# Patient Record
Sex: MALE | Race: WHITE | NOT HISPANIC OR LATINO | Employment: OTHER
[De-identification: names, ages, dates, MRNs, and addresses within clinical notes are randomized per-mention and may not be internally consistent; named-entity substitution may affect disease eponyms.]

---

## 2020-02-24 ENCOUNTER — HEALTH MAINTENANCE LETTER (OUTPATIENT)
Age: 80
End: 2020-02-24

## 2020-12-13 ENCOUNTER — HEALTH MAINTENANCE LETTER (OUTPATIENT)
Age: 80
End: 2020-12-13

## 2021-04-17 ENCOUNTER — HEALTH MAINTENANCE LETTER (OUTPATIENT)
Age: 81
End: 2021-04-17

## 2021-06-10 ENCOUNTER — RECORDS - HEALTHEAST (OUTPATIENT)
Dept: ADMINISTRATIVE | Facility: OTHER | Age: 81
End: 2021-06-10

## 2021-06-10 ENCOUNTER — OFFICE VISIT (OUTPATIENT)
Dept: NEUROLOGY | Facility: CLINIC | Age: 81
End: 2021-06-10
Payer: MEDICARE

## 2021-06-10 ENCOUNTER — AMBULATORY - HEALTHEAST (OUTPATIENT)
Dept: LAB | Facility: HOSPITAL | Age: 81
End: 2021-06-10

## 2021-06-10 VITALS
DIASTOLIC BLOOD PRESSURE: 70 MMHG | BODY MASS INDEX: 26.88 KG/M2 | SYSTOLIC BLOOD PRESSURE: 116 MMHG | HEART RATE: 125 BPM | WEIGHT: 186 LBS

## 2021-06-10 DIAGNOSIS — H53.2 DIPLOPIA: Primary | ICD-10-CM

## 2021-06-10 DIAGNOSIS — R29.2 HYPERREFLEXIA: ICD-10-CM

## 2021-06-10 DIAGNOSIS — H53.2 DIPLOPIA: ICD-10-CM

## 2021-06-10 DIAGNOSIS — G25.0 ESSENTIAL TREMOR: ICD-10-CM

## 2021-06-10 DIAGNOSIS — R20.8 DECREASED VIBRATORY SENSE: ICD-10-CM

## 2021-06-10 LAB
ALBUMIN SERPL-MCNC: 4.2 G/DL (ref 3.5–5)
ALP SERPL-CCNC: 75 U/L (ref 45–120)
ALT SERPL W P-5'-P-CCNC: 16 U/L (ref 0–45)
ANION GAP SERPL CALCULATED.3IONS-SCNC: 9 MMOL/L (ref 5–18)
AST SERPL W P-5'-P-CCNC: 24 U/L (ref 0–40)
BILIRUB SERPL-MCNC: 0.7 MG/DL (ref 0–1)
BUN SERPL-MCNC: 21 MG/DL (ref 8–28)
CALCIUM SERPL-MCNC: 9.5 MG/DL (ref 8.5–10.5)
CHLORIDE BLD-SCNC: 105 MMOL/L (ref 98–107)
CO2 SERPL-SCNC: 25 MMOL/L (ref 22–31)
CREAT SERPL-MCNC: 1.09 MG/DL (ref 0.7–1.3)
GFR SERPL CREATININE-BSD FRML MDRD: >60 ML/MIN/1.73M2
GLUCOSE BLD-MCNC: 89 MG/DL (ref 70–125)
POTASSIUM BLD-SCNC: 3.9 MMOL/L (ref 3.5–5)
PROT SERPL-MCNC: 7.2 G/DL (ref 6–8)
SODIUM SERPL-SCNC: 139 MMOL/L (ref 136–145)
TSH SERPL DL<=0.005 MIU/L-ACNC: 2.42 UIU/ML (ref 0.3–5)
VIT B12 SERPL-MCNC: 555 PG/ML (ref 213–816)

## 2021-06-10 PROCEDURE — 99215 OFFICE O/P EST HI 40 MIN: CPT | Performed by: PSYCHIATRY & NEUROLOGY

## 2021-06-10 RX ORDER — ASPIRIN 81 MG/1
81 TABLET, CHEWABLE ORAL
COMMUNITY

## 2021-06-10 RX ORDER — PRIMIDONE 50 MG/1
TABLET ORAL
Qty: 180 TABLET | Refills: 0 | Status: SHIPPED | OUTPATIENT
Start: 2021-06-10 | End: 2021-07-13

## 2021-06-10 RX ORDER — TAMSULOSIN HYDROCHLORIDE 0.4 MG/1
0.4 CAPSULE ORAL
COMMUNITY
Start: 2020-07-08

## 2021-06-10 RX ORDER — ALLOPURINOL 300 MG/1
150 TABLET ORAL
COMMUNITY
Start: 2020-10-08

## 2021-06-10 RX ORDER — METOPROLOL SUCCINATE 100 MG/1
100 TABLET, EXTENDED RELEASE ORAL
COMMUNITY
End: 2021-07-13

## 2021-06-10 NOTE — LETTER
6/10/2021         RE: Willis Soni  [address]  [facility] [address]        Dear Colleague,    Thank you for referring your patient, Willis Soni, to the [facility] [place]. Please see a copy of my visit note below.    NEUROLOGY OUTPATIENT CONSULT NOTE   Rojelio 10, 2021     CHIEF COMPLAINT/REASON FOR VISIT/REASON FOR CONSULT  Patient presents with:  Tremors: Patient is in today for tremors, bilateral upper extremities. Duration of longer than 20 years, has worsened in the last 5 years.     REASON FOR CONSULTATION- Tremors    HISTORY OF PRESENT ILLNESS  Willis Soni is a 81 year old male seen today for evaluation of tremors.  He reports that he has had tremors for the last 20 years.  They have progressively gotten worse.  He does have family history of tremors including his brother sister and mother.  Tremors are mainly prominent when he is trying to eat.  Tremors are not bothersome at rest.  Reports no significant parkinsonian symptoms.  No memory problems.  No difficulty keeping up with other people.  Denies any balance issues.  Denies any weakness in the arms.  Tremors are present in both upper extremities.  Has not tried any medications for this.    He does complain of episodes of double vision.  These are brought on by a staring on the television louder when he strongly uses hands and eyes to complete a jigsaw puzzle.  Has had cataract surgery in the past.  Closing one eye does improve the double vision.    Previous history is reviewed and this is unchanged.    PAST MEDICAL/SURGICAL HISTORY  Past Medical History:   Diagnosis Date     Adenomatous polyp 03/1995     Anxiety      ED (erectile dysfunction)      Herpes simplex      HTN (hypertension)      Hyperlipidemia LDL goal < 130 05/2004     Renal stones 08/1994     Tremors 1980     Patient Active Problem List   Diagnosis     Hyperlipidemia with target LDL less than 130     HTN (hypertension)     Renal stone     Cataract    Significant tremors    FAMILY HISTORY  Family History   Problem Relation Age of Onset     Hypertension Mother         d. 80     Asthma Son      C.A.D. Father         MI d. 68     Obesity Daughter    Family history suggestive of heart attack, tremors    SOCIAL HISTORY  Social History     Tobacco Use     Smoking status: Never Smoker     Smokeless tobacco: Never Used   Substance Use Topics     Alcohol use: Yes     Comment: on occasion     Drug use: No       SYSTEMS REVIEW  Twelve-system ROS was done and other than the HPI this was negative except for tremors    MEDICATIONS  allopurinol (ZYLOPRIM) 300 MG tablet, Take 150 mg by mouth  aspirin (ASA) 81 MG chewable tablet, Take 81 mg by mouth  metoprolol (LOPRESSOR) 25 MG tablet, Take 1 tablet by mouth 2 times daily.  metoprolol succinate ER (TOPROL-XL) 100 MG 24 hr tablet, Take 100 mg by mouth  Multiple Vitamin (MULTIVITAMIN PO), Take  by mouth.  potassium citrate (UROCIT-K) 10 MEQ (1080 MG) SR tablet, Take 1 tablet by mouth daily. Please schedule an appointment for future refills  pravastatin (PRAVACHOL) 40 MG tablet, Take 1 tablet by mouth At Bedtime.  tamsulosin (FLOMAX) 0.4 MG capsule, Take 0.4 mg by mouth  chlorthalidone (HYGROTON) 25 MG tablet, Take 1 tablet (25 mg) by mouth every morning (Patient not taking: Reported on 6/10/2021)    No current facility-administered medications on file prior to visit.        PHYSICAL EXAMINATION  VITALS: /70 (BP Location: Right arm, Patient Position: Sitting)   Pulse 125   Wt 84.4 kg (186 lb)   BMI 26.88 kg/m    GENERAL: Healthy appearing, alert, no acute distress, normal habitus.  CARDIOVASCULAR: Extremities warm and well perfused. Pulses present.   EYES: Funduscopic exam limited.  NEUROLOGICAL:  Patient is awake and oriented to self, place and time.  Attention span is normal.  Memory is grossly intact.  Language is fluent and follows commands appropriately.  Appropriate fund of knowledge. Cranial nerves 2-12 are  intact. There is no pronator drift.  Motor exam shows 5/5 strength in all extremities.  Tone is symmetric bilaterally in upper and lower extremities.  Reflexes are symmetric and 2+ brisk in upper extremities and lower extremities. Sensory exam is grossly intact to light touch, pin prick and vibration with decreased vibration in the right leg at the ankle.  Finger to nose and heel to shin is without dysmetria.  Romberg is negative.  Gait is normal and the patient is able to do tandem walk and walk on toes and heels.  On drawing concentric circles he does have significant tremor in both upper extremities.  Postural tremor is seen on exam as well.    DIAGNOSTICS  RELEVANT LABS  CMP and CBC non-contributory    OUTSIDE RECORDS  Outside referral notes and chart notes were reviewed and pertinent information has been summarized (in addition to the HPI):-Seen in primary care.  Goes to to [place]  every year.  He has had the Covid shot.  Had recently had some blood testing done.    IMPRESSION/REPORT/PLAN  Diplopia  Essential tremor  Hyperreflexia  Decreased vibratory sense    This is a 81 year old male tremor suggestive of essential tremor.  I will check blood work to look for causes of essential tremor.  We will put him on primidone and see how he does.    Patient does have some intermittent episodes of diplopia.  This could be related to myasthenia gravis since they are more prominent when he is more tired.  We will check a myasthenia gravis panel.  Could try medication down the road if need be.  Could consider an MRI of the brain.    On examination patient has decreased vibratory sense in the right foot along with hyperreflexia.  Is possible he has cervical spinal stenosis versus other lesions.  It is unclear if this is related to the tremor or not.  We will see how he does with the primidone and could further check an MRI of the cervical spine to see if a cause for these exam findings can be found.    -     primidone  (MYSOLINE) 50 MG tablet; Take 0.5 tabs BID for 1 week and then increase to 1 tabs BID as tolerated and needed.  -     Vitamin B12; Future  -     TSH with free T4 reflex; Future  -     ACETYLCHOLINE RECEPTOR BINDING; Future  -     STRIATED MUSCLE ANTIBODY IGG; Future  -     ACETYLCHOLINE MODULATING ANTIBODY; Future  -     ACETYLCHOLINE RECEPTOR BLOCKING PAULINA; Future  -     Ceruloplasmin; Future  -     Copper level; Future  -     Comprehensive metabolic panel; Future  -     Vitamin B1 whole blood; Future  -     Protein Immunofixation Serum; Future  -     Protein electrophoresis; Future    Return in about 1 month (around 7/10/2021) for In-Clinic Visit, After testing.    Over 60 minutes were spent coordinating the care for the patient on the day of the encounter.  This includes previsit, during visit and post visit activities as documented above.  (Activities include but not inclusive of reviewing chart, reviewing outside records, reviewing labs and imaging study results as well as the images, patient visit time including getting history and exam,  use if applicable, review of test results with the patient and coming up with a plan in a shared model, counseling patient and family, education and answering patient questions, EMR , EMR diagnosis entry and problem list management, medication reconciliation and prescription management if applicable, paperwork if applicable, printing documents and documentation of the visit activities.)  MDM:-High risk with extensive testing    Gabe Rosa MD  Neurologist  [facility]  Tel:- [phone]    This note was dictated using voice recognition software.  Any grammatical or context distortions are unintentional and inherent to the software.        Again, thank you for allowing me to participate in the care of your patient.        Sincerely,        Gabe Rosa MD

## 2021-06-10 NOTE — PROGRESS NOTES
NEUROLOGY OUTPATIENT CONSULT NOTE   Rojelio 10, 2021     CHIEF COMPLAINT/REASON FOR VISIT/REASON FOR CONSULT  Patient presents with:  Tremors: Patient is in today for tremors, bilateral upper extremities. Duration of longer than 20 years, has worsened in the last 5 years.     REASON FOR CONSULTATION- Tremors    HISTORY OF PRESENT ILLNESS  Willis Soin is a 81 year old male seen today for evaluation of tremors.  He reports that he has had tremors for the last 20 years.  They have progressively gotten worse.  He does have family history of tremors including his brother sister and mother.  Tremors are mainly prominent when he is trying to eat.  Tremors are not bothersome at rest.  Reports no significant parkinsonian symptoms.  No memory problems.  No difficulty keeping up with other people.  Denies any balance issues.  Denies any weakness in the arms.  Tremors are present in both upper extremities.  Has not tried any medications for this.    He does complain of episodes of double vision.  These are brought on by a staring on the television louder when he strongly uses hands and eyes to complete a jigsaw puzzle.  Has had cataract surgery in the past.  Closing one eye does improve the double vision.    Previous history is reviewed and this is unchanged.    PAST MEDICAL/SURGICAL HISTORY  Past Medical History:   Diagnosis Date     Adenomatous polyp 03/1995     Anxiety      ED (erectile dysfunction)      Herpes simplex      HTN (hypertension)      Hyperlipidemia LDL goal < 130 05/2004     Renal stones 08/1994     Tremors 1980     Patient Active Problem List   Diagnosis     Hyperlipidemia with target LDL less than 130     HTN (hypertension)     Renal stone     Cataract   Significant tremors    FAMILY HISTORY  Family History   Problem Relation Age of Onset     Hypertension Mother         d. 80     Asthma Son      C.A.D. Father         MI d. 68     Obesity Daughter    Family history suggestive of heart attack,  tremors    SOCIAL HISTORY  Social History     Tobacco Use     Smoking status: Never Smoker     Smokeless tobacco: Never Used   Substance Use Topics     Alcohol use: Yes     Comment: on occasion     Drug use: No       SYSTEMS REVIEW  Twelve-system ROS was done and other than the HPI this was negative except for tremors    MEDICATIONS  allopurinol (ZYLOPRIM) 300 MG tablet, Take 150 mg by mouth  aspirin (ASA) 81 MG chewable tablet, Take 81 mg by mouth  metoprolol (LOPRESSOR) 25 MG tablet, Take 1 tablet by mouth 2 times daily.  metoprolol succinate ER (TOPROL-XL) 100 MG 24 hr tablet, Take 100 mg by mouth  Multiple Vitamin (MULTIVITAMIN PO), Take  by mouth.  potassium citrate (UROCIT-K) 10 MEQ (1080 MG) SR tablet, Take 1 tablet by mouth daily. Please schedule an appointment for future refills  pravastatin (PRAVACHOL) 40 MG tablet, Take 1 tablet by mouth At Bedtime.  tamsulosin (FLOMAX) 0.4 MG capsule, Take 0.4 mg by mouth  chlorthalidone (HYGROTON) 25 MG tablet, Take 1 tablet (25 mg) by mouth every morning (Patient not taking: Reported on 6/10/2021)    No current facility-administered medications on file prior to visit.        PHYSICAL EXAMINATION  VITALS: /70 (BP Location: Right arm, Patient Position: Sitting)   Pulse 125   Wt 84.4 kg (186 lb)   BMI 26.88 kg/m    GENERAL: Healthy appearing, alert, no acute distress, normal habitus.  CARDIOVASCULAR: Extremities warm and well perfused. Pulses present.   EYES: Funduscopic exam limited.  NEUROLOGICAL:  Patient is awake and oriented to self, place and time.  Attention span is normal.  Memory is grossly intact.  Language is fluent and follows commands appropriately.  Appropriate fund of knowledge. Cranial nerves 2-12 are intact. There is no pronator drift.  Motor exam shows 5/5 strength in all extremities.  Tone is symmetric bilaterally in upper and lower extremities.  Reflexes are symmetric and 2+ brisk in upper extremities and lower extremities. Sensory exam is  grossly intact to light touch, pin prick and vibration with decreased vibration in the right leg at the ankle.  Finger to nose and heel to shin is without dysmetria.  Romberg is negative.  Gait is normal and the patient is able to do tandem walk and walk on toes and heels.  On drawing concentric circles he does have significant tremor in both upper extremities.  Postural tremor is seen on exam as well.    DIAGNOSTICS  RELEVANT LABS  CMP and CBC non-contributory    OUTSIDE RECORDS  Outside referral notes and chart notes were reviewed and pertinent information has been summarized (in addition to the HPI):-Seen in primary care.  Goes to to [place]  every year.  He has had the Covid shot.  Had recently had some blood testing done.    IMPRESSION/REPORT/PLAN  Diplopia  Essential tremor  Hyperreflexia  Decreased vibratory sense    This is a 81 year old male tremor suggestive of essential tremor.  I will check blood work to look for causes of essential tremor.  We will put him on primidone and see how he does.    Patient does have some intermittent episodes of diplopia.  This could be related to myasthenia gravis since they are more prominent when he is more tired.  We will check a myasthenia gravis panel.  Could try medication down the road if need be.  Could consider an MRI of the brain.    On examination patient has decreased vibratory sense in the right foot along with hyperreflexia.  Is possible he has cervical spinal stenosis versus other lesions.  It is unclear if this is related to the tremor or not.  We will see how he does with the primidone and could further check an MRI of the cervical spine to see if a cause for these exam findings can be found.    -     primidone (MYSOLINE) 50 MG tablet; Take 0.5 tabs BID for 1 week and then increase to 1 tabs BID as tolerated and needed.  -     Vitamin B12; Future  -     TSH with free T4 reflex; Future  -     ACETYLCHOLINE RECEPTOR BINDING; Future  -     STRIATED MUSCLE  ANTIBODY IGG; Future  -     ACETYLCHOLINE MODULATING ANTIBODY; Future  -     ACETYLCHOLINE RECEPTOR BLOCKING PAULINA; Future  -     Ceruloplasmin; Future  -     Copper level; Future  -     Comprehensive metabolic panel; Future  -     Vitamin B1 whole blood; Future  -     Protein Immunofixation Serum; Future  -     Protein electrophoresis; Future    Return in about 1 month (around 7/10/2021) for In-Clinic Visit, After testing.    Over 60 minutes were spent coordinating the care for the patient on the day of the encounter.  This includes previsit, during visit and post visit activities as documented above.  (Activities include but not inclusive of reviewing chart, reviewing outside records, reviewing labs and imaging study results as well as the images, patient visit time including getting history and exam,  use if applicable, review of test results with the patient and coming up with a plan in a shared model, counseling patient and family, education and answering patient questions, EMR , EMR diagnosis entry and problem list management, medication reconciliation and prescription management if applicable, paperwork if applicable, printing documents and documentation of the visit activities.)  MDM:-High risk with extensive testing    Gabe Rosa MD  Neurologist  [facility]  Tel:- [phone]    This note was dictated using voice recognition software.  Any grammatical or context distortions are unintentional and inherent to the software.

## 2021-06-10 NOTE — NURSING NOTE
Chief Complaint   Patient presents with     Tremors     Patient is in today for tremors, bilateral upper extremities. Duration of longer than 20 years, has worsened in the last 5 years.      Mignon Guzman MA on 6/10/2021 at 1:34 PM

## 2021-06-13 LAB — ARUP MISCELLANEOUS TEST: NORMAL

## 2021-06-14 LAB
ALBUMIN PERCENT: 65.8 % (ref 51–67)
ALBUMIN SERPL ELPH-MCNC: 4.5 G/DL (ref 3.2–4.7)
ALPHA 1 PERCENT: 2.2 % (ref 2–4)
ALPHA 2 PERCENT: 9.1 % (ref 5–13)
ALPHA1 GLOB SERPL ELPH-MCNC: 0.2 G/DL (ref 0.1–0.3)
ALPHA2 GLOB SERPL ELPH-MCNC: 0.6 G/DL (ref 0.4–0.9)
B-GLOBULIN SERPL ELPH-MCNC: 0.7 G/DL (ref 0.7–1.2)
BETA PERCENT: 9.5 % (ref 10–17)
CERULOPLASMIN SERPL-MCNC: 23 MG/DL (ref 20–60)
COPPER SERPL-MCNC: 107.8 UG/DL (ref 70–140)
GAMMA GLOB SERPL ELPH-MCNC: 0.9 G/DL (ref 0.6–1.4)
GAMMA GLOBULIN PERCENT: 13.4 % (ref 9–20)
MISCELLANEOUS TEST DEPT. - HE HISTORICAL: NORMAL
PATH ICD:: ABNORMAL
PATH ICD:: NORMAL
PERFORMING LAB: NORMAL
PROT PATTERN SERPL ELPH-IMP: ABNORMAL
PROT PATTERN SERPL IFE-IMP: NORMAL
PROT SERPL-MCNC: 6.9 G/DL (ref 6–8)
REVIEWING PATHOLOGIST: ABNORMAL
REVIEWING PATHOLOGIST: NORMAL
SPECIMEN STATUS: NORMAL
TEST NAME: NORMAL

## 2021-06-16 LAB — ARUP MISCELLANEOUS TEST: NORMAL

## 2021-06-17 ENCOUNTER — TELEPHONE (OUTPATIENT)
Dept: NEUROLOGY | Facility: CLINIC | Age: 81
End: 2021-06-17

## 2021-06-17 LAB — VIT B1 PYROPHOSHATE BLD-SCNC: 186 NMOL/L (ref 70–180)

## 2021-06-17 NOTE — TELEPHONE ENCOUNTER
Pt lm that he is having dizziness, and feels light hardheaded form this new med. He is afraid to take it.  [phone]

## 2021-06-17 NOTE — TELEPHONE ENCOUNTER
Spoke with pt. Encouraged pt to try .25 pill BID and 0.5 pill BID as tolerated. Pt mentioned if it continues to give him symptoms of blurry eyes, lightheadedness, dizziness, feeling tired and fatigued, he may stop the medication. Informed pt to try the 0.25 first and if it does not work, other therapies can be looked into at the next appointment.     Pt is requesting lab results .    Component      Latest Ref Rng & Units 6/10/2021           2:58 PM   Sodium      136 - 145 mmol/L 139   Potassium      3.5 - 5.0 mmol/L 3.9   Chloride      98 - 107 mmol/L 105   CO2      22 - 31 mmol/L 25   Anion Gap, Calculation      5 - 18 mmol/L 9   Glucose      70 - 125 mg/dL 89   BUN      8 - 28 mg/dL 21   Creatinine      0.70 - 1.30 mg/dL 1.09   GFR MDRD Af Amer      >60 mL/min/1.73m2 >60   GFR MDRD Non Af Amer      >60 mL/min/1.73m2 >60   Bilirubin, Total      0.0 - 1.0 mg/dL 0.7   Calcium      8.5 - 10.5 mg/dL 9.5   Protein, Total      6.0 - 8.0 g/dL 7.2   ALBUMIN      3.5 - 5.0 g/dL 4.2   Alkaline Phosphatase      45 - 120 U/L 75   AST      0 - 40 U/L 24   ALT      0 - 45 U/L 16   Albumin %      51.0 - 67.0 %    Albumin       3.2 - 4.7 g/dL    Alpha 1 %      2.0 - 4.0 %    Alpha 1      0.1 - 0.3 g/dL    Alpha 2 %      5.0 - 13.0 %    Alpha 2      0.4 - 0.9 g/dL    % Beta      10.0 - 17.0 %    Beta      0.7 - 1.2 g/dL    Gamma Globulin %      9.0 - 20.0 %    Gamma Globulin      0.6 - 1.4 g/dL    ELP Comment          Path ICD:          Interpreted By:          Miscellanous Test Dept.       Chemistry Reference Test   Test Name       Striated Muscle Antibodies, IgG with Reflex to Titer   Performing Lab       [facility] . . .   Scan Result       See [facility] Miscellaneous Test for results   Immunofixation Electrophoresis, Serum          TSH      0.30 - 5.00 uIU/mL 2.42   Vitamin B-12      213 - 816 pg/mL 555   Vitamin B1, Whole Blood      70 - 180 nmol/L 186 (H)   Copper, Serum/Plasma      70.0 - 140.0 ug/dL 107.8    Ceruloplasmin      20 - 60 mg/dL 23   [facility] Miscellaneous Test       SEE NOTE     Component      Latest Ref Rng & Units 6/10/2021           2:58 PM   Sodium      136 - 145 mmol/L    Potassium      3.5 - 5.0 mmol/L    Chloride      98 - 107 mmol/L    CO2      22 - 31 mmol/L    Anion Gap, Calculation      5 - 18 mmol/L    Glucose      70 - 125 mg/dL    BUN      8 - 28 mg/dL    Creatinine      0.70 - 1.30 mg/dL    GFR MDRD Af Amer      >60 mL/min/1.73m2    GFR MDRD Non Af Amer      >60 mL/min/1.73m2    Bilirubin, Total      0.0 - 1.0 mg/dL    Calcium      8.5 - 10.5 mg/dL    Protein, Total      6.0 - 8.0 g/dL 6.9   ALBUMIN      3.5 - 5.0 g/dL    Alkaline Phosphatase      45 - 120 U/L    AST      0 - 40 U/L    ALT      0 - 45 U/L    Albumin %      51.0 - 67.0 % 65.8   Albumin       3.2 - 4.7 g/dL 4.5   Alpha 1 %      2.0 - 4.0 % 2.2   Alpha 1      0.1 - 0.3 g/dL 0.2   Alpha 2 %      5.0 - 13.0 % 9.1   Alpha 2      0.4 - 0.9 g/dL 0.6   % Beta      10.0 - 17.0 % 9.5 (L)   Beta      0.7 - 1.2 g/dL 0.7   Gamma Globulin %      9.0 - 20.0 % 13.4   Gamma Globulin      0.6 - 1.4 g/dL 0.9   ELP Comment       Unremarkable protein electrophoresis.   Path ICD:       G25.0   Interpreted By:       Zana Maddox MD   Miscellanous Test Dept.          Test Name          Performing Lab          Scan Result          Immunofixation Electrophoresis, Serum          TSH      0.30 - 5.00 uIU/mL    Vitamin B-12      213 - 816 pg/mL    Vitamin B1, Whole Blood      70 - 180 nmol/L    Copper, Serum/Plasma      70.0 - 140.0 ug/dL    Ceruloplasmin      20 - 60 mg/dL    [facility] Miscellaneous Test       SEE NOTE     Component      Latest Ref Rng & Units 6/10/2021           2:58 PM   Sodium      136 - 145 mmol/L    Potassium      3.5 - 5.0 mmol/L    Chloride      98 - 107 mmol/L    CO2      22 - 31 mmol/L    Anion Gap, Calculation      5 - 18 mmol/L    Glucose      70 - 125 mg/dL    BUN      8 - 28 mg/dL    Creatinine      0.70 - 1.30 mg/dL    GFR  MDRD Af Amer      >60 mL/min/1.73m2    GFR MDRD Non Af Amer      >60 mL/min/1.73m2    Bilirubin, Total      0.0 - 1.0 mg/dL    Calcium      8.5 - 10.5 mg/dL    Protein, Total      6.0 - 8.0 g/dL    ALBUMIN      3.5 - 5.0 g/dL    Alkaline Phosphatase      45 - 120 U/L    AST      0 - 40 U/L    ALT      0 - 45 U/L    Albumin %      51.0 - 67.0 %    Albumin       3.2 - 4.7 g/dL    Alpha 1 %      2.0 - 4.0 %    Alpha 1      0.1 - 0.3 g/dL    Alpha 2 %      5.0 - 13.0 %    Alpha 2      0.4 - 0.9 g/dL    % Beta      10.0 - 17.0 %    Beta      0.7 - 1.2 g/dL    Gamma Globulin %      9.0 - 20.0 %    Gamma Globulin      0.6 - 1.4 g/dL    ELP Comment          Path ICD:       G25.0   Interpreted By:       Zana Maddox MD   Miscellanous Test Dept.          Test Name          Performing Lab          Scan Result          Immunofixation Electrophoresis, Serum       Unremarkable immunofixation electrophoresis.   TSH      0.30 - 5.00 uIU/mL    Vitamin B-12      213 - 816 pg/mL    Vitamin B1, Whole Blood      70 - 180 nmol/L    Copper, Serum/Plasma      70.0 - 140.0 ug/dL    Ceruloplasmin      20 - 60 mg/dL    [facility] Miscellaneous Test

## 2021-06-17 NOTE — TELEPHONE ENCOUNTER
Try taking 0.25 pill BID or 0.5 pill BID as tolerated. If does not work then will have to try other meds at next apt.

## 2021-06-19 LAB
MISCELLANEOUS TEST DEPT. - HE HISTORICAL: NORMAL
PERFORMING LAB: NORMAL
SPECIMEN STATUS: NORMAL
TEST NAME: NORMAL

## 2021-06-25 NOTE — TELEPHONE ENCOUNTER
Follow up call to patient in regards to Primidone. He is still having symptoms of lightheadedness. Informed pt it is ok to stop the medication since he is still having side effects. Informed him, labs look ok otherwise. No further questions and pt verbalized understanding. Reminded pt of follow up appointment scheduled on 7/14 at 7:50 AM.

## 2021-07-13 ENCOUNTER — OFFICE VISIT (OUTPATIENT)
Dept: NEUROLOGY | Facility: CLINIC | Age: 81
End: 2021-07-13
Payer: MEDICARE

## 2021-07-13 VITALS
HEIGHT: 70 IN | DIASTOLIC BLOOD PRESSURE: 78 MMHG | BODY MASS INDEX: 26.4 KG/M2 | HEART RATE: 65 BPM | WEIGHT: 184.4 LBS | SYSTOLIC BLOOD PRESSURE: 140 MMHG

## 2021-07-13 DIAGNOSIS — G25.0 ESSENTIAL TREMOR: ICD-10-CM

## 2021-07-13 DIAGNOSIS — R29.2 HYPERREFLEXIA: ICD-10-CM

## 2021-07-13 DIAGNOSIS — R20.8 DECREASED VIBRATORY SENSE: ICD-10-CM

## 2021-07-13 DIAGNOSIS — H53.2 DIPLOPIA: Primary | ICD-10-CM

## 2021-07-13 PROCEDURE — 99215 OFFICE O/P EST HI 40 MIN: CPT | Performed by: PSYCHIATRY & NEUROLOGY

## 2021-07-13 RX ORDER — TOPIRAMATE 25 MG/1
TABLET, FILM COATED ORAL
Qty: 360 TABLET | Refills: 0 | Status: SHIPPED | OUTPATIENT
Start: 2021-07-13 | End: 2021-07-13

## 2021-07-13 RX ORDER — PROPRANOLOL HYDROCHLORIDE 20 MG/1
TABLET ORAL
Qty: 180 TABLET | Refills: 1 | Status: SHIPPED | OUTPATIENT
Start: 2021-07-13 | End: 2021-08-06

## 2021-07-13 ASSESSMENT — MIFFLIN-ST. JEOR: SCORE: 1537.68

## 2021-07-13 NOTE — NURSING NOTE
Chief Complaint   Patient presents with     Tremors     Stopped Primidone due to dizziness and feeling tired.     Sondra Queen RN on 7/13/2021 at 7:45 AM

## 2021-07-13 NOTE — PROGRESS NOTES
NEUROLOGY OUTPATIENT PROGRESS NOTE   Jul 13, 2021     CHIEF COMPLAINT/REASON FOR VISIT/REASON FOR CONSULT  Patient presents with:  Tremors: Stopped Primidone due to dizziness and feeling tired.    REASON FOR CONSULTATION- Tremors    HISTORY OF PRESENT ILLNESS  Willis Soni is a 81 year old male seen today for evaluation of tremors.  He reports that he has had tremors for the last 20 years.  They have progressively gotten worse.  He does have family history of tremors including his brother sister and mother.  Tremors are mainly prominent when he is trying to eat.  Tremors are not bothersome at rest.  Reports no significant parkinsonian symptoms.  No memory problems.  No difficulty keeping up with other people.  Denies any balance issues.  Denies any weakness in the arms.  Tremors are present in both upper extremities.  Has not tried any medications for this.    He does complain of episodes of double vision.  These are brought on by a staring on the television louder when he strongly uses hands and eyes to complete a jigsaw puzzle.  Has had cataract surgery in the past.  Closing one eye does improve the double vision.    7/14/21  Patient returns today.  He did try the primidone and try to cut down on the dose but continued to have lightheadedness.  He is currently off the medication.  Tremors are about the same.  Reports that the tremors only the revised to something.  Tremors have been there for several years.  He is on metoprolol for his heart.  The dosage recorded in the computer was incorrect and is taking 25 mg twice a day.  Took some time to clarify what dose he was taking.  Topamax was also discussed with him though he does have a history of kidney stones and initially prescribed though later was discontinued.    Patient does have some hyperreflexia on examination with some spells of double vision.  We talked about looking into this further and he wants to hold off on that for right now since this is  unrelated to his tremors.  He will be traveling to [place] in September.  Will be back in April.    Previous history is reviewed and this is unchanged.    PAST MEDICAL/SURGICAL HISTORY  Past Medical History:   Diagnosis Date     Adenomatous polyp 03/1995     Anxiety      ED (erectile dysfunction)      Herpes simplex      HTN (hypertension)      Hyperlipidemia LDL goal < 130 05/2004     Renal stones 08/1994     Tremors 1980     Patient Active Problem List   Diagnosis     Hyperlipidemia with target LDL less than 130     HTN (hypertension)     Renal stone     Cataract   Significant tremors    FAMILY HISTORY  Family History   Problem Relation Age of Onset     Hypertension Mother         d. 80     Parkinsonism Mother      Asthma Son      C.A.D. Father         MI d. 68     Obesity Daughter    Family history suggestive of heart attack, tremors    SOCIAL HISTORY  Social History     Tobacco Use     Smoking status: Never Smoker     Smokeless tobacco: Never Used   Substance Use Topics     Alcohol use: Yes     Comment: on occasion     Drug use: No       SYSTEMS REVIEW  Twelve-system ROS was done and other than the HPI this was negative except for tremors  No new issues.    MEDICATIONS  allopurinol (ZYLOPRIM) 300 MG tablet, Take 150 mg by mouth  aspirin (ASA) 81 MG chewable tablet, Take 81 mg by mouth  metoprolol (LOPRESSOR) 25 MG tablet, Take 1 tablet by mouth 2 times daily.  Multiple Vitamin (MULTIVITAMIN PO), Take  by mouth.  potassium citrate (UROCIT-K) 10 MEQ (1080 MG) SR tablet, Take 1 tablet by mouth daily. Please schedule an appointment for future refills  pravastatin (PRAVACHOL) 40 MG tablet, Take 1 tablet by mouth At Bedtime.  tamsulosin (FLOMAX) 0.4 MG capsule, Take 0.4 mg by mouth  chlorthalidone (HYGROTON) 25 MG tablet, Take 1 tablet (25 mg) by mouth every morning (Patient not taking: Reported on 6/10/2021)    No current facility-administered medications on file prior to visit.       PHYSICAL EXAMINATION  VITALS: BP  "(!) 140/78   Pulse 65   Ht 1.778 m (5' 10\")   Wt 83.6 kg (184 lb 6.4 oz)   BMI 26.46 kg/m    GENERAL: Healthy appearing, alert, no acute distress, normal habitus.  CARDIOVASCULAR: Extremities warm and well perfused. Pulses present.   NEUROLOGICAL:  Patient is awake and oriented to self, place and time.  Attention span is normal.  Memory is grossly intact.  Language is fluent and follows commands appropriately.  Appropriate fund of knowledge. Cranial nerves 2-12 are intact. There is no pronator drift.  Motor exam shows 5/5 strength in all extremities.  Tone is symmetric bilaterally in upper and lower extremities.  Reflexes are symmetric and 2+ brisk in upper extremities and lower extremities. Sensory exam is grossly intact to light touch, pin prick and vibration with decreased vibration in the right leg at the ankle.  Finger to nose and heel to shin is without dysmetria.  Romberg is negative.  Gait is normal and the patient is able to do tandem walk and walk on toes and heels.  Previously - on drawing concentric circles he does have significant tremor in both upper extremities.  Postural tremor is seen on exam as well.    DIAGNOSTICS  RELEVANT LABS  CMP and CBC non-contributory    OUTSIDE RECORDS  Outside referral notes and chart notes were reviewed and pertinent information has been summarized (in addition to the HPI):-Seen in primary care.  Goes to to [place]  every year.  He has had the Covid shot.  Had recently had some blood testing done.    LABS  Component      Latest Ref Rng & Units 6/10/2021           2:58 PM   Sodium      136 - 145 mmol/L 139   Potassium      3.5 - 5.0 mmol/L 3.9   Chloride      98 - 107 mmol/L 105   CO2      22 - 31 mmol/L 25   Anion Gap, Calculation      5 - 18 mmol/L 9   Glucose      70 - 125 mg/dL 89   BUN      8 - 28 mg/dL 21   Creatinine      0.70 - 1.30 mg/dL 1.09   GFR MDRD Af Amer      >60 mL/min/1.73m2 >60   GFR MDRD Non Af Amer      >60 mL/min/1.73m2 >60   Bilirubin, Total     "  0.0 - 1.0 mg/dL 0.7   Calcium      8.5 - 10.5 mg/dL 9.5   Protein, Total      6.0 - 8.0 g/dL 7.2   ALBUMIN      3.5 - 5.0 g/dL 4.2   Alkaline Phosphatase      45 - 120 U/L 75   AST      0 - 40 U/L 24   ALT      0 - 45 U/L 16   Albumin %      51.0 - 67.0 %    Albumin       3.2 - 4.7 g/dL    Alpha 1 %      2.0 - 4.0 %    Alpha 1      0.1 - 0.3 g/dL    Alpha 2 %      5.0 - 13.0 %    Alpha 2      0.4 - 0.9 g/dL    % Beta      10.0 - 17.0 %    Beta      0.7 - 1.2 g/dL    Gamma Globulin %      9.0 - 20.0 %    Gamma Globulin      0.6 - 1.4 g/dL    ELP Comment          Path ICD:          Interpreted By:          Immunofixation Electrophoresis, Serum          TSH      0.30 - 5.00 uIU/mL 2.42   Vitamin B-12      213 - 816 pg/mL 555   Vitamin B1, Whole Blood      70 - 180 nmol/L 186 (H)   Copper, Serum/Plasma      70.0 - 140.0 ug/dL 107.8   Ceruloplasmin      20 - 60 mg/dL 23     Component      Latest Ref Rng & Units 6/10/2021           2:58 PM   Sodium      136 - 145 mmol/L    Potassium      3.5 - 5.0 mmol/L    Chloride      98 - 107 mmol/L    CO2      22 - 31 mmol/L    Anion Gap, Calculation      5 - 18 mmol/L    Glucose      70 - 125 mg/dL    BUN      8 - 28 mg/dL    Creatinine      0.70 - 1.30 mg/dL    GFR MDRD Af Amer      >60 mL/min/1.73m2    GFR MDRD Non Af Amer      >60 mL/min/1.73m2    Bilirubin, Total      0.0 - 1.0 mg/dL    Calcium      8.5 - 10.5 mg/dL    Protein, Total      6.0 - 8.0 g/dL 6.9   ALBUMIN      3.5 - 5.0 g/dL    Alkaline Phosphatase      45 - 120 U/L    AST      0 - 40 U/L    ALT      0 - 45 U/L    Albumin %      51.0 - 67.0 % 65.8   Albumin       3.2 - 4.7 g/dL 4.5   Alpha 1 %      2.0 - 4.0 % 2.2   Alpha 1      0.1 - 0.3 g/dL 0.2   Alpha 2 %      5.0 - 13.0 % 9.1   Alpha 2      0.4 - 0.9 g/dL 0.6   % Beta      10.0 - 17.0 % 9.5 (L)   Beta      0.7 - 1.2 g/dL 0.7   Gamma Globulin %      9.0 - 20.0 % 13.4   Gamma Globulin      0.6 - 1.4 g/dL 0.9   ELP Comment       Unremarkable protein  electrophoresis.   Path ICD:       G25.0   Interpreted By:       Zana Maddox MD   Immunofixation Electrophoresis, Serum          TSH      0.30 - 5.00 uIU/mL    Vitamin B-12      213 - 816 pg/mL    Vitamin B1, Whole Blood      70 - 180 nmol/L    Copper, Serum/Plasma      70.0 - 140.0 ug/dL    Ceruloplasmin      20 - 60 mg/dL      Component      Latest Ref Rng & Units 6/10/2021           2:58 PM   Sodium      136 - 145 mmol/L    Potassium      3.5 - 5.0 mmol/L    Chloride      98 - 107 mmol/L    CO2      22 - 31 mmol/L    Anion Gap, Calculation      5 - 18 mmol/L    Glucose      70 - 125 mg/dL    BUN      8 - 28 mg/dL    Creatinine      0.70 - 1.30 mg/dL    GFR MDRD Af Amer      >60 mL/min/1.73m2    GFR MDRD Non Af Amer      >60 mL/min/1.73m2    Bilirubin, Total      0.0 - 1.0 mg/dL    Calcium      8.5 - 10.5 mg/dL    Protein, Total      6.0 - 8.0 g/dL    ALBUMIN      3.5 - 5.0 g/dL    Alkaline Phosphatase      45 - 120 U/L    AST      0 - 40 U/L    ALT      0 - 45 U/L    Albumin %      51.0 - 67.0 %    Albumin       3.2 - 4.7 g/dL    Alpha 1 %      2.0 - 4.0 %    Alpha 1      0.1 - 0.3 g/dL    Alpha 2 %      5.0 - 13.0 %    Alpha 2      0.4 - 0.9 g/dL    % Beta      10.0 - 17.0 %    Beta      0.7 - 1.2 g/dL    Gamma Globulin %      9.0 - 20.0 %    Gamma Globulin      0.6 - 1.4 g/dL    ELP Comment          Path ICD:       G25.0   Interpreted By:       Zana Maddox MD   Immunofixation Electrophoresis, Serum       Unremarkable immunofixation electrophoresis.   TSH      0.30 - 5.00 uIU/mL    Vitamin B-12      213 - 816 pg/mL    Vitamin B1, Whole Blood      70 - 180 nmol/L    Copper, Serum/Plasma      70.0 - 140.0 ug/dL    Ceruloplasmin      20 - 60 mg/dL    MG panel negative    IMPRESSION/REPORT/PLAN  Diplopia  Essential tremor  Hyperreflexia stools  Decreased vibratory sense  History of kidney stones  Coronary artery disease    This is a 81 year old male tremor suggestive of essential tremor.  Blood work  is negative for cause of tremors.  Primidone caused side effects of lightheadedness.  Topamax cannot be used because of history of kidney stones.  He is currently on metoprolol which is stopped.  This is used for coronary artery disease.  He was on 25 mg twice a day of regular metoprolol.  I will start him on propanolol to see how he does.    Patient does have some intermittent episodes of diplopia.  This could be related to myasthenia gravis since they are more prominent when he is more tired.  Myasthenia gravis panel was negative.  We will continue to monitor.  Could consider an MRI of the brain.    On examination patient has decreased vibratory sense in the right foot along with hyperreflexia.  Is possible he has cervical spinal stenosis versus other lesions.  It is unclear if this is related to the tremor or not.  Most likely this is not related to the tremors.  Could check a MRI of the cervical spine down the road we will hold off for right now per patient request.    -     propranolol (INDERAL) 20 MG tablet; Take 1 tab BID and increase by 1 tab BID every week to a max of 3 tabs BID as needed and tolerated.    Return in about 1 month (around 8/13/2021) for In-Clinic Visit.    Over 40 minutes were spent coordinating the care for the patient on the day of the encounter.  This includes previsit, during visit and post visit activities as documented above.  Getting history regarding kidney stones.  Topamax was initially prescribed and stopped.  Getting information about the exact doses of metoprolol that he is taking.  Stop metoprolol and then restarting propanolol  (Activities include but not inclusive of reviewing chart, reviewing outside records, reviewing labs and imaging study results as well as the images, patient visit time including getting history and exam,  use if applicable, review of test results with the patient and coming up with a plan in a shared model, counseling patient and family,  education and answering patient questions, EMR , EMR diagnosis entry and problem list management, medication reconciliation and prescription management if applicable, paperwork if applicable, printing documents and documentation of the visit activities.)    Gabe Rosa MD  Neurologist  [facility]  Tel:- [phone]    This note was dictated using voice recognition software.  Any grammatical or context distortions are unintentional and inherent to the software.

## 2021-07-13 NOTE — LETTER
7/13/2021         RE: Willis Soni  [address]  [facility] [address]        Dear Colleague,    Thank you for referring your patient, Willis Soni, to the [facility] [place]. Please see a copy of my visit note below.    NEUROLOGY OUTPATIENT PROGRESS NOTE   Jul 13, 2021     CHIEF COMPLAINT/REASON FOR VISIT/REASON FOR CONSULT  Patient presents with:  Tremors: Stopped Primidone due to dizziness and feeling tired.    REASON FOR CONSULTATION- Tremors    HISTORY OF PRESENT ILLNESS  Willis Soni is a 81 year old male seen today for evaluation of tremors.  He reports that he has had tremors for the last 20 years.  They have progressively gotten worse.  He does have family history of tremors including his brother sister and mother.  Tremors are mainly prominent when he is trying to eat.  Tremors are not bothersome at rest.  Reports no significant parkinsonian symptoms.  No memory problems.  No difficulty keeping up with other people.  Denies any balance issues.  Denies any weakness in the arms.  Tremors are present in both upper extremities.  Has not tried any medications for this.    He does complain of episodes of double vision.  These are brought on by a staring on the television louder when he strongly uses hands and eyes to complete a jigsaw puzzle.  Has had cataract surgery in the past.  Closing one eye does improve the double vision.    7/14/21  Patient returns today.  He did try the primidone and try to cut down on the dose but continued to have lightheadedness.  He is currently off the medication.  Tremors are about the same.  Reports that the tremors only the revised to something.  Tremors have been there for several years.  He is on metoprolol for his heart.  The dosage recorded in the computer was incorrect and is taking 25 mg twice a day.  Took some time to clarify what dose he was taking.  Topamax was also discussed with him though he does have a history of kidney  stones and initially prescribed though later was discontinued.    Patient does have some hyperreflexia on examination with some spells of double vision.  We talked about looking into this further and he wants to hold off on that for right now since this is unrelated to his tremors.  He will be traveling to [place] in September.  Will be back in April.    Previous history is reviewed and this is unchanged.    PAST MEDICAL/SURGICAL HISTORY  Past Medical History:   Diagnosis Date     Adenomatous polyp 03/1995     Anxiety      ED (erectile dysfunction)      Herpes simplex      HTN (hypertension)      Hyperlipidemia LDL goal < 130 05/2004     Renal stones 08/1994     Tremors 1980     Patient Active Problem List   Diagnosis     Hyperlipidemia with target LDL less than 130     HTN (hypertension)     Renal stone     Cataract   Significant tremors    FAMILY HISTORY  Family History   Problem Relation Age of Onset     Hypertension Mother         d. 80     Parkinsonism Mother      Asthma Son      C.A.D. Father         MI d. 68     Obesity Daughter    Family history suggestive of heart attack, tremors    SOCIAL HISTORY  Social History     Tobacco Use     Smoking status: Never Smoker     Smokeless tobacco: Never Used   Substance Use Topics     Alcohol use: Yes     Comment: on occasion     Drug use: No       SYSTEMS REVIEW  Twelve-system ROS was done and other than the HPI this was negative except for tremors  No new issues.    MEDICATIONS  allopurinol (ZYLOPRIM) 300 MG tablet, Take 150 mg by mouth  aspirin (ASA) 81 MG chewable tablet, Take 81 mg by mouth  metoprolol (LOPRESSOR) 25 MG tablet, Take 1 tablet by mouth 2 times daily.  Multiple Vitamin (MULTIVITAMIN PO), Take  by mouth.  potassium citrate (UROCIT-K) 10 MEQ (1080 MG) SR tablet, Take 1 tablet by mouth daily. Please schedule an appointment for future refills  pravastatin (PRAVACHOL) 40 MG tablet, Take 1 tablet by mouth At Bedtime.  tamsulosin (FLOMAX) 0.4 MG capsule, Take  "0.4 mg by mouth  chlorthalidone (HYGROTON) 25 MG tablet, Take 1 tablet (25 mg) by mouth every morning (Patient not taking: Reported on 6/10/2021)    No current facility-administered medications on file prior to visit.       PHYSICAL EXAMINATION  VITALS: BP (!) 140/78   Pulse 65   Ht 1.778 m (5' 10\")   Wt 83.6 kg (184 lb 6.4 oz)   BMI 26.46 kg/m    GENERAL: Healthy appearing, alert, no acute distress, normal habitus.  CARDIOVASCULAR: Extremities warm and well perfused. Pulses present.   NEUROLOGICAL:  Patient is awake and oriented to self, place and time.  Attention span is normal.  Memory is grossly intact.  Language is fluent and follows commands appropriately.  Appropriate fund of knowledge. Cranial nerves 2-12 are intact. There is no pronator drift.  Motor exam shows 5/5 strength in all extremities.  Tone is symmetric bilaterally in upper and lower extremities.  Reflexes are symmetric and 2+ brisk in upper extremities and lower extremities. Sensory exam is grossly intact to light touch, pin prick and vibration with decreased vibration in the right leg at the ankle.  Finger to nose and heel to shin is without dysmetria.  Romberg is negative.  Gait is normal and the patient is able to do tandem walk and walk on toes and heels.  Previously - on drawing concentric circles he does have significant tremor in both upper extremities.  Postural tremor is seen on exam as well.    DIAGNOSTICS  RELEVANT LABS  CMP and CBC non-contributory    OUTSIDE RECORDS  Outside referral notes and chart notes were reviewed and pertinent information has been summarized (in addition to the HPI):-Seen in primary care.  Goes to to [place]  every year.  He has had the Covid shot.  Had recently had some blood testing done.    LABS  Component      Latest Ref Rng & Units 6/10/2021           2:58 PM   Sodium      136 - 145 mmol/L 139   Potassium      3.5 - 5.0 mmol/L 3.9   Chloride      98 - 107 mmol/L 105   CO2      22 - 31 mmol/L 25   Anion " Gap, Calculation      5 - 18 mmol/L 9   Glucose      70 - 125 mg/dL 89   BUN      8 - 28 mg/dL 21   Creatinine      0.70 - 1.30 mg/dL 1.09   GFR MDRD Af Amer      >60 mL/min/1.73m2 >60   GFR MDRD Non Af Amer      >60 mL/min/1.73m2 >60   Bilirubin, Total      0.0 - 1.0 mg/dL 0.7   Calcium      8.5 - 10.5 mg/dL 9.5   Protein, Total      6.0 - 8.0 g/dL 7.2   ALBUMIN      3.5 - 5.0 g/dL 4.2   Alkaline Phosphatase      45 - 120 U/L 75   AST      0 - 40 U/L 24   ALT      0 - 45 U/L 16   Albumin %      51.0 - 67.0 %    Albumin       3.2 - 4.7 g/dL    Alpha 1 %      2.0 - 4.0 %    Alpha 1      0.1 - 0.3 g/dL    Alpha 2 %      5.0 - 13.0 %    Alpha 2      0.4 - 0.9 g/dL    % Beta      10.0 - 17.0 %    Beta      0.7 - 1.2 g/dL    Gamma Globulin %      9.0 - 20.0 %    Gamma Globulin      0.6 - 1.4 g/dL    ELP Comment          Path ICD:          Interpreted By:          Immunofixation Electrophoresis, Serum          TSH      0.30 - 5.00 uIU/mL 2.42   Vitamin B-12      213 - 816 pg/mL 555   Vitamin B1, Whole Blood      70 - 180 nmol/L 186 (H)   Copper, Serum/Plasma      70.0 - 140.0 ug/dL 107.8   Ceruloplasmin      20 - 60 mg/dL 23     Component      Latest Ref Rng & Units 6/10/2021           2:58 PM   Sodium      136 - 145 mmol/L    Potassium      3.5 - 5.0 mmol/L    Chloride      98 - 107 mmol/L    CO2      22 - 31 mmol/L    Anion Gap, Calculation      5 - 18 mmol/L    Glucose      70 - 125 mg/dL    BUN      8 - 28 mg/dL    Creatinine      0.70 - 1.30 mg/dL    GFR MDRD Af Amer      >60 mL/min/1.73m2    GFR MDRD Non Af Amer      >60 mL/min/1.73m2    Bilirubin, Total      0.0 - 1.0 mg/dL    Calcium      8.5 - 10.5 mg/dL    Protein, Total      6.0 - 8.0 g/dL 6.9   ALBUMIN      3.5 - 5.0 g/dL    Alkaline Phosphatase      45 - 120 U/L    AST      0 - 40 U/L    ALT      0 - 45 U/L    Albumin %      51.0 - 67.0 % 65.8   Albumin       3.2 - 4.7 g/dL 4.5   Alpha 1 %      2.0 - 4.0 % 2.2   Alpha 1      0.1 - 0.3 g/dL 0.2   Alpha 2 %       5.0 - 13.0 % 9.1   Alpha 2      0.4 - 0.9 g/dL 0.6   % Beta      10.0 - 17.0 % 9.5 (L)   Beta      0.7 - 1.2 g/dL 0.7   Gamma Globulin %      9.0 - 20.0 % 13.4   Gamma Globulin      0.6 - 1.4 g/dL 0.9   ELP Comment       Unremarkable protein electrophoresis.   Path ICD:       G25.0   Interpreted By:       Zana Maddox MD   Immunofixation Electrophoresis, Serum          TSH      0.30 - 5.00 uIU/mL    Vitamin B-12      213 - 816 pg/mL    Vitamin B1, Whole Blood      70 - 180 nmol/L    Copper, Serum/Plasma      70.0 - 140.0 ug/dL    Ceruloplasmin      20 - 60 mg/dL      Component      Latest Ref Rng & Units 6/10/2021           2:58 PM   Sodium      136 - 145 mmol/L    Potassium      3.5 - 5.0 mmol/L    Chloride      98 - 107 mmol/L    CO2      22 - 31 mmol/L    Anion Gap, Calculation      5 - 18 mmol/L    Glucose      70 - 125 mg/dL    BUN      8 - 28 mg/dL    Creatinine      0.70 - 1.30 mg/dL    GFR MDRD Af Amer      >60 mL/min/1.73m2    GFR MDRD Non Af Amer      >60 mL/min/1.73m2    Bilirubin, Total      0.0 - 1.0 mg/dL    Calcium      8.5 - 10.5 mg/dL    Protein, Total      6.0 - 8.0 g/dL    ALBUMIN      3.5 - 5.0 g/dL    Alkaline Phosphatase      45 - 120 U/L    AST      0 - 40 U/L    ALT      0 - 45 U/L    Albumin %      51.0 - 67.0 %    Albumin       3.2 - 4.7 g/dL    Alpha 1 %      2.0 - 4.0 %    Alpha 1      0.1 - 0.3 g/dL    Alpha 2 %      5.0 - 13.0 %    Alpha 2      0.4 - 0.9 g/dL    % Beta      10.0 - 17.0 %    Beta      0.7 - 1.2 g/dL    Gamma Globulin %      9.0 - 20.0 %    Gamma Globulin      0.6 - 1.4 g/dL    ELP Comment          Path ICD:       G25.0   Interpreted By:       Zana Maddox MD   Immunofixation Electrophoresis, Serum       Unremarkable immunofixation electrophoresis.   TSH      0.30 - 5.00 uIU/mL    Vitamin B-12      213 - 816 pg/mL    Vitamin B1, Whole Blood      70 - 180 nmol/L    Copper, Serum/Plasma      70.0 - 140.0 ug/dL    Ceruloplasmin      20 - 60 mg/dL    MG panel  negative    IMPRESSION/REPORT/PLAN  Diplopia  Essential tremor  Hyperreflexia stools  Decreased vibratory sense  History of kidney stones  Coronary artery disease    This is a 81 year old male tremor suggestive of essential tremor.  Blood work is negative for cause of tremors.  Primidone caused side effects of lightheadedness.  Topamax cannot be used because of history of kidney stones.  He is currently on metoprolol which is stopped.  This is used for coronary artery disease.  He was on 25 mg twice a day of regular metoprolol.  I will start him on propanolol to see how he does.    Patient does have some intermittent episodes of diplopia.  This could be related to myasthenia gravis since they are more prominent when he is more tired.  Myasthenia gravis panel was negative.  We will continue to monitor.  Could consider an MRI of the brain.    On examination patient has decreased vibratory sense in the right foot along with hyperreflexia.  Is possible he has cervical spinal stenosis versus other lesions.  It is unclear if this is related to the tremor or not.  Most likely this is not related to the tremors.  Could check a MRI of the cervical spine down the road we will hold off for right now per patient request.    -     propranolol (INDERAL) 20 MG tablet; Take 1 tab BID and increase by 1 tab BID every week to a max of 3 tabs BID as needed and tolerated.    Return in about 1 month (around 8/13/2021) for In-Clinic Visit.    Over 40 minutes were spent coordinating the care for the patient on the day of the encounter.  This includes previsit, during visit and post visit activities as documented above.  Getting history regarding kidney stones.  Topamax was initially prescribed and stopped.  Getting information about the exact doses of metoprolol that he is taking.  Stop metoprolol and then restarting propanolol  (Activities include but not inclusive of reviewing chart, reviewing outside records, reviewing labs and imaging  study results as well as the images, patient visit time including getting history and exam,  use if applicable, review of test results with the patient and coming up with a plan in a shared model, counseling patient and family, education and answering patient questions, EMR , EMR diagnosis entry and problem list management, medication reconciliation and prescription management if applicable, paperwork if applicable, printing documents and documentation of the visit activities.)    Gabe Rosa MD  Neurologist  [facility]  Tel:- [phone]    This note was dictated using voice recognition software.  Any grammatical or context distortions are unintentional and inherent to the software.        Again, thank you for allowing me to participate in the care of your patient.        Sincerely,        Gabe Rosa MD

## 2021-07-15 ENCOUNTER — TELEPHONE (OUTPATIENT)
Dept: NEUROLOGY | Facility: CLINIC | Age: 81
End: 2021-07-15
Payer: MEDICARE

## 2021-07-15 NOTE — TELEPHONE ENCOUNTER
We were talking about only the starting dose of 1 tablet in the morning and 1 in the evening.  He does not necessarily need to take the 3 tablets in the morning and 3 in the evening.  He should increase as needed and tolerated for his tremors.

## 2021-07-15 NOTE — TELEPHONE ENCOUNTER
Spoke with pt. Informed ok to stay on 1 tab BID, and can increase as needed and tolerated. No further questions and verbalizes understanding.   Mignon Guzman MA on 7/15/2021 at 3:26 PM

## 2021-07-15 NOTE — TELEPHONE ENCOUNTER
He would like a call please regarding the dosage of his propranolol to make sure he is doing it correctly? [phone]

## 2021-07-15 NOTE — TELEPHONE ENCOUNTER
Spoke with pt, informed pt of SIG: Take 1 tab BID and increase by 1 tab BID every week to a max of 3 tabs BID as needed and tolerated.   Informed pt this would be 6 total tabs a day after the 3rd week. Pt mentioned provider stated he would only be taking 1 tab in the morning and one at night. He would like you to confirmed that the directions on the Rx is correct or if he is only supposed to be taking total of 2 tabs daily with no increase of tabs.   Mignon Guzman MA on 7/15/2021 at 3:12 PM

## 2021-08-04 DIAGNOSIS — G25.0 ESSENTIAL TREMOR: ICD-10-CM

## 2021-08-06 RX ORDER — PROPRANOLOL HYDROCHLORIDE 20 MG/1
TABLET ORAL
Qty: 180 TABLET | Refills: 1 | Status: SHIPPED | OUTPATIENT
Start: 2021-08-06 | End: 2021-09-01

## 2021-08-06 NOTE — TELEPHONE ENCOUNTER
Refill request for propranolol 20 mg   Medication T'd for review and signature  Mignon Guzman MA on 8/6/2021 at 8:25 AM

## 2021-09-01 ENCOUNTER — OFFICE VISIT (OUTPATIENT)
Dept: NEUROLOGY | Facility: CLINIC | Age: 81
End: 2021-09-01
Payer: MEDICARE

## 2021-09-01 VITALS
HEART RATE: 65 BPM | HEIGHT: 70 IN | WEIGHT: 184 LBS | BODY MASS INDEX: 26.34 KG/M2 | SYSTOLIC BLOOD PRESSURE: 147 MMHG | DIASTOLIC BLOOD PRESSURE: 74 MMHG

## 2021-09-01 DIAGNOSIS — G25.0 ESSENTIAL TREMOR: Primary | ICD-10-CM

## 2021-09-01 DIAGNOSIS — H53.2 DIPLOPIA: ICD-10-CM

## 2021-09-01 DIAGNOSIS — R29.2 HYPERREFLEXIA: ICD-10-CM

## 2021-09-01 DIAGNOSIS — R20.8 DECREASED VIBRATORY SENSE: ICD-10-CM

## 2021-09-01 PROCEDURE — 99214 OFFICE O/P EST MOD 30 MIN: CPT | Performed by: PSYCHIATRY & NEUROLOGY

## 2021-09-01 RX ORDER — METOPROLOL TARTRATE 25 MG/1
25 TABLET, FILM COATED ORAL 2 TIMES DAILY
Qty: 180 TABLET | Refills: 3 | Status: SHIPPED | OUTPATIENT
Start: 2021-09-01

## 2021-09-01 ASSESSMENT — MIFFLIN-ST. JEOR: SCORE: 1535.87

## 2021-09-01 NOTE — NURSING NOTE
Chief Complaint   Patient presents with     Follow Up     Medication check - Propranolol. Pt has been taking 1 tab BID.      Mignon Guzman MA on 9/1/2021 at 8:39 AM

## 2021-09-01 NOTE — PROGRESS NOTES
NEUROLOGY OUTPATIENT PROGRESS NOTE   Sep 1, 2021     CHIEF COMPLAINT/REASON FOR VISIT/REASON FOR CONSULT  Patient presents with:  Follow Up: Medication check - Propranolol. Pt has been taking 1 tab BID.     REASON FOR CONSULTATION- Tremors    HISTORY OF PRESENT ILLNESS  Willis Soni is a 81 year old male seen today for evaluation of tremors.  He reports that he has had tremors for the last 20 years.  They have progressively gotten worse.  He does have family history of tremors including his brother sister and mother.  Tremors are mainly prominent when he is trying to eat.  Tremors are not bothersome at rest.  Reports no significant parkinsonian symptoms.  No memory problems.  No difficulty keeping up with other people.  Denies any balance issues.  Denies any weakness in the arms.  Tremors are present in both upper extremities.  Has not tried any medications for this.    He does complain of episodes of double vision.  These are brought on by a staring on the television louder when he strongly uses hands and eyes to complete a jigsaw puzzle.  Has had cataract surgery in the past.  Closing one eye does improve the double vision.    7/14/21  Patient returns today.  He did try the primidone and try to cut down on the dose but continued to have lightheadedness.  He is currently off the medication.  Tremors are about the same.  Reports that the tremors only the revised to something.  Tremors have been there for several years.  He is on metoprolol for his heart.  The dosage recorded in the computer was incorrect and is taking 25 mg twice a day.  Took some time to clarify what dose he was taking.  Topamax was also discussed with him though he does have a history of kidney stones and initially prescribed though later was discontinued.    Patient does have some hyperreflexia on examination with some spells of double vision.  We talked about looking into this further and he wants to hold off on that for right now since this  is unrelated to his tremors.  He will be traveling to [place] in September.  Will be back in April.    9/1/21  Patient returns today.  He was put on propanolol previously instead of the metoprolol that he was taking for his coronary artery disease.  The propanolol he could only tolerate 20 mg twice a day.  Reports that it did not really help the tremor.  He continues to have tremor intermittently.  Reports that when he is anxious the tremors are worse.  Continues to have tremors when he is trying to eat or write.  Reports a lot of somnolence with the medication though the wife feels that he was already somnolent even before the medication.  After long discussion was decided to go back to the medication he was taking.    Continues to have double vision.  No significant issues with balance.  Is traveling to [place] and will be back in April.  Wants to go back to the previous metoprolol that he was taking.  Might want to see a neurologist at [facility] when he is down there.    Previous history is reviewed and this is unchanged.    PAST MEDICAL/SURGICAL HISTORY  Past Medical History:   Diagnosis Date     Adenomatous polyp 03/1995     Anxiety      ED (erectile dysfunction)      Herpes simplex      HTN (hypertension)      Hyperlipidemia LDL goal < 130 05/2004     Renal stones 08/1994     Tremors 1980     Patient Active Problem List   Diagnosis     Hyperlipidemia with target LDL less than 130     HTN (hypertension)     Renal stone     Cataract   Significant tremors    FAMILY HISTORY  Family History   Problem Relation Age of Onset     Hypertension Mother         d. 80     Parkinsonism Mother      Asthma Son      C.A.D. Father         MI d. 68     Obesity Daughter    Family history suggestive of heart attack, tremors    SOCIAL HISTORY  Social History     Tobacco Use     Smoking status: Never Smoker     Smokeless tobacco: Never Used   Substance Use Topics     Alcohol use: Yes     Comment: on occasion     Drug use:  "No       SYSTEMS REVIEW  Twelve-system ROS was done and other than the HPI this was negative except for tremors  Reports no other new issues today.    MEDICATIONS  allopurinol (ZYLOPRIM) 300 MG tablet, Take 150 mg by mouth  aspirin (ASA) 81 MG chewable tablet, Take 81 mg by mouth  chlorthalidone (HYGROTON) 25 MG tablet, Take 1 tablet (25 mg) by mouth every morning  Multiple Vitamin (MULTIVITAMIN PO), Take  by mouth.  potassium citrate (UROCIT-K) 10 MEQ (1080 MG) SR tablet, Take 1 tablet by mouth daily. Please schedule an appointment for future refills  pravastatin (PRAVACHOL) 40 MG tablet, Take 1 tablet by mouth At Bedtime.  tamsulosin (FLOMAX) 0.4 MG capsule, Take 0.4 mg by mouth    No current facility-administered medications on file prior to visit.       PHYSICAL EXAMINATION  VITALS: BP (!) 147/74 (BP Location: Right arm, Patient Position: Sitting)   Pulse 65   Ht 1.778 m (5' 10\")   Wt 83.5 kg (184 lb)   BMI 26.40 kg/m    GENERAL: Healthy appearing, alert, no acute distress, normal habitus.  CARDIOVASCULAR: Extremities warm and well perfused. Pulses present.   NEUROLOGICAL:  Patient is awake and oriented to self, place and time.  Attention span is normal.  Memory is grossly intact.  Language is fluent and follows commands appropriately.  Appropriate fund of knowledge. Cranial nerves 2-12 are intact. There is no pronator drift.  Motor exam shows 5/5 strength in all extremities.  Tone is symmetric bilaterally in upper and lower extremities.  (Previously reflexes are symmetric and 2+ brisk in upper extremities and lower extremities. Sensory exam is grossly intact to light touch, pin prick and vibration with decreased vibration in the right leg at the ankle.) Finger to nose and heel to shin is without dysmetria.  Romberg is negative.  Gait is normal and the patient is able to do tandem walk and walk on toes and heels.  On drawing concentric circles he does have significant tremor in both upper extremities.  " Postural tremor is seen on exam as well.  No significant improvement noted with the propanolol.    DIAGNOSTICS  RELEVANT LABS  CMP and CBC non-contributory    OUTSIDE RECORDS  Outside referral notes and chart notes were reviewed and pertinent information has been summarized (in addition to the HPI):-Seen in primary care.  Goes to to [place]  every year.  He has had the Covid shot.  Had recently had some blood testing done.    LABS  Component      Latest Ref Rng & Units 6/10/2021           2:58 PM   Sodium      136 - 145 mmol/L 139   Potassium      3.5 - 5.0 mmol/L 3.9   Chloride      98 - 107 mmol/L 105   CO2      22 - 31 mmol/L 25   Anion Gap, Calculation      5 - 18 mmol/L 9   Glucose      70 - 125 mg/dL 89   BUN      8 - 28 mg/dL 21   Creatinine      0.70 - 1.30 mg/dL 1.09   GFR MDRD Af Amer      >60 mL/min/1.73m2 >60   GFR MDRD Non Af Amer      >60 mL/min/1.73m2 >60   Bilirubin, Total      0.0 - 1.0 mg/dL 0.7   Calcium      8.5 - 10.5 mg/dL 9.5   Protein, Total      6.0 - 8.0 g/dL 7.2   ALBUMIN      3.5 - 5.0 g/dL 4.2   Alkaline Phosphatase      45 - 120 U/L 75   AST      0 - 40 U/L 24   ALT      0 - 45 U/L 16   Albumin %      51.0 - 67.0 %    Albumin       3.2 - 4.7 g/dL    Alpha 1 %      2.0 - 4.0 %    Alpha 1      0.1 - 0.3 g/dL    Alpha 2 %      5.0 - 13.0 %    Alpha 2      0.4 - 0.9 g/dL    % Beta      10.0 - 17.0 %    Beta      0.7 - 1.2 g/dL    Gamma Globulin %      9.0 - 20.0 %    Gamma Globulin      0.6 - 1.4 g/dL    ELP Comment          Path ICD:          Interpreted By:          Immunofixation Electrophoresis, Serum          TSH      0.30 - 5.00 uIU/mL 2.42   Vitamin B-12      213 - 816 pg/mL 555   Vitamin B1, Whole Blood      70 - 180 nmol/L 186 (H)   Copper, Serum/Plasma      70.0 - 140.0 ug/dL 107.8   Ceruloplasmin      20 - 60 mg/dL 23     Component      Latest Ref Rng & Units 6/10/2021           2:58 PM   Sodium      136 - 145 mmol/L    Potassium      3.5 - 5.0 mmol/L    Chloride      98 - 107  mmol/L    CO2      22 - 31 mmol/L    Anion Gap, Calculation      5 - 18 mmol/L    Glucose      70 - 125 mg/dL    BUN      8 - 28 mg/dL    Creatinine      0.70 - 1.30 mg/dL    GFR MDRD Af Amer      >60 mL/min/1.73m2    GFR MDRD Non Af Amer      >60 mL/min/1.73m2    Bilirubin, Total      0.0 - 1.0 mg/dL    Calcium      8.5 - 10.5 mg/dL    Protein, Total      6.0 - 8.0 g/dL 6.9   ALBUMIN      3.5 - 5.0 g/dL    Alkaline Phosphatase      45 - 120 U/L    AST      0 - 40 U/L    ALT      0 - 45 U/L    Albumin %      51.0 - 67.0 % 65.8   Albumin       3.2 - 4.7 g/dL 4.5   Alpha 1 %      2.0 - 4.0 % 2.2   Alpha 1      0.1 - 0.3 g/dL 0.2   Alpha 2 %      5.0 - 13.0 % 9.1   Alpha 2      0.4 - 0.9 g/dL 0.6   % Beta      10.0 - 17.0 % 9.5 (L)   Beta      0.7 - 1.2 g/dL 0.7   Gamma Globulin %      9.0 - 20.0 % 13.4   Gamma Globulin      0.6 - 1.4 g/dL 0.9   ELP Comment       Unremarkable protein electrophoresis.   Path ICD:       G25.0   Interpreted By:       Zana Maddox MD   Immunofixation Electrophoresis, Serum          TSH      0.30 - 5.00 uIU/mL    Vitamin B-12      213 - 816 pg/mL    Vitamin B1, Whole Blood      70 - 180 nmol/L    Copper, Serum/Plasma      70.0 - 140.0 ug/dL    Ceruloplasmin      20 - 60 mg/dL      Component      Latest Ref Rng & Units 6/10/2021           2:58 PM   Sodium      136 - 145 mmol/L    Potassium      3.5 - 5.0 mmol/L    Chloride      98 - 107 mmol/L    CO2      22 - 31 mmol/L    Anion Gap, Calculation      5 - 18 mmol/L    Glucose      70 - 125 mg/dL    BUN      8 - 28 mg/dL    Creatinine      0.70 - 1.30 mg/dL    GFR MDRD Af Amer      >60 mL/min/1.73m2    GFR MDRD Non Af Amer      >60 mL/min/1.73m2    Bilirubin, Total      0.0 - 1.0 mg/dL    Calcium      8.5 - 10.5 mg/dL    Protein, Total      6.0 - 8.0 g/dL    ALBUMIN      3.5 - 5.0 g/dL    Alkaline Phosphatase      45 - 120 U/L    AST      0 - 40 U/L    ALT      0 - 45 U/L    Albumin %      51.0 - 67.0 %    Albumin       3.2 - 4.7 g/dL     Alpha 1 %      2.0 - 4.0 %    Alpha 1      0.1 - 0.3 g/dL    Alpha 2 %      5.0 - 13.0 %    Alpha 2      0.4 - 0.9 g/dL    % Beta      10.0 - 17.0 %    Beta      0.7 - 1.2 g/dL    Gamma Globulin %      9.0 - 20.0 %    Gamma Globulin      0.6 - 1.4 g/dL    ELP Comment          Path ICD:       G25.0   Interpreted By:       Zana Maddox MD   Immunofixation Electrophoresis, Serum       Unremarkable immunofixation electrophoresis.   TSH      0.30 - 5.00 uIU/mL    Vitamin B-12      213 - 816 pg/mL    Vitamin B1, Whole Blood      70 - 180 nmol/L    Copper, Serum/Plasma      70.0 - 140.0 ug/dL    Ceruloplasmin      20 - 60 mg/dL    MG panel negative    IMPRESSION/REPORT/PLAN  Diplopia  Essential tremor  Hyperreflexia   Decreased vibratory sense  History of kidney stones  Coronary artery disease  Question of anxiety making tremors worse    This is a 81 year old male tremor suggestive of essential tremor.  Blood work is negative for cause of tremors.  Primidone caused side effects of lightheadedness.  Topamax cannot be used because of history of kidney stones.  He is currently on metoprolol which is stopped.  This is used for coronary artery disease.  He was on 25 mg twice a day of regular metoprolol.  With switching him to propanolol he reports some somnolence with no improvement in the tremor.  He could only try the 20 mg twice a day dosing.  At this point we will switch him back to metoprolol.  Could retry propanolol in April when he is back in [place] or could consider trying gabapentin at that point.    Him reporting that the tremors are related to anxiety-could consider an anxiety medication to see how much he improves    Patient does have some intermittent episodes of diplopia.  This could be related to myasthenia gravis since they are more prominent when he is more tired.  Myasthenia gravis panel was negative.  We will continue to monitor.  This is stable today.  Could consider an MRI of the brain.    On  examination patient has decreased vibratory sense in the right foot along with hyperreflexia.  Is possible he has cervical spinal stenosis versus other lesions.  It is unclear if this is related to the tremor or not.  Most likely this is not related to the tremors.  Could check a MRI of the cervical spine down the road we will hold off for right now per patient request.    -     metoprolol tartrate (LOPRESSOR) 25 MG tablet; Take 1 tablet (25 mg) by mouth 2 times daily      Return in about 9 months (around 6/1/2022) for In-Clinic Visit.    Over 30 minutes were spent coordinating the care for the patient on the day of the encounter.  This includes previsit, during visit and post visit activities as documented above.    Discussion between metoprolol and propanolol and deciding on medication.  2 unstable problem with prescription management.  (Activities include but not inclusive of reviewing chart, reviewing outside records, reviewing labs and imaging study results as well as the images, patient visit time including getting history and exam,  use if applicable, review of test results with the patient and coming up with a plan in a shared model, counseling patient and family, education and answering patient questions, EMR , EMR diagnosis entry and problem list management, medication reconciliation and prescription management if applicable, paperwork if applicable, printing documents and documentation of the visit activities.)    Gabe Rosa MD  Neurologist  [facility] [place] [facility]  Tel:- [phone]    This note was dictated using voice recognition software.  Any grammatical or context distortions are unintentional and inherent to the software.

## 2021-09-01 NOTE — LETTER
9/1/2021         RE: Willis Soni  [address]  [facility] [address]        Dear Colleague,    Thank you for referring your patient, Willis Soni, to the [facility] [place]. Please see a copy of my visit note below.    NEUROLOGY OUTPATIENT PROGRESS NOTE   Sep 1, 2021     CHIEF COMPLAINT/REASON FOR VISIT/REASON FOR CONSULT  Patient presents with:  Follow Up: Medication check - Propranolol. Pt has been taking 1 tab BID.     REASON FOR CONSULTATION- Tremors    HISTORY OF PRESENT ILLNESS  Willis Soni is a 81 year old male seen today for evaluation of tremors.  He reports that he has had tremors for the last 20 years.  They have progressively gotten worse.  He does have family history of tremors including his brother sister and mother.  Tremors are mainly prominent when he is trying to eat.  Tremors are not bothersome at rest.  Reports no significant parkinsonian symptoms.  No memory problems.  No difficulty keeping up with other people.  Denies any balance issues.  Denies any weakness in the arms.  Tremors are present in both upper extremities.  Has not tried any medications for this.    He does complain of episodes of double vision.  These are brought on by a staring on the television louder when he strongly uses hands and eyes to complete a jigsaw puzzle.  Has had cataract surgery in the past.  Closing one eye does improve the double vision.    7/14/21  Patient returns today.  He did try the primidone and try to cut down on the dose but continued to have lightheadedness.  He is currently off the medication.  Tremors are about the same.  Reports that the tremors only the revised to something.  Tremors have been there for several years.  He is on metoprolol for his heart.  The dosage recorded in the computer was incorrect and is taking 25 mg twice a day.  Took some time to clarify what dose he was taking.  Topamax was also discussed with him though he does have a history of  kidney stones and initially prescribed though later was discontinued.    Patient does have some hyperreflexia on examination with some spells of double vision.  We talked about looking into this further and he wants to hold off on that for right now since this is unrelated to his tremors.  He will be traveling to [place] in September.  Will be back in April.    9/1/21  Patient returns today.  He was put on propanolol previously instead of the metoprolol that he was taking for his coronary artery disease.  The propanolol he could only tolerate 20 mg twice a day.  Reports that it did not really help the tremor.  He continues to have tremor intermittently.  Reports that when he is anxious the tremors are worse.  Continues to have tremors when he is trying to eat or write.  Reports a lot of somnolence with the medication though the wife feels that he was already somnolent even before the medication.  After long discussion was decided to go back to the medication he was taking.    Continues to have double vision.  No significant issues with balance.  Is traveling to [place] and will be back in April.  Wants to go back to the previous metoprolol that he was taking.  Might want to see a neurologist at [facility] when he is down there.    Previous history is reviewed and this is unchanged.    PAST MEDICAL/SURGICAL HISTORY  Past Medical History:   Diagnosis Date     Adenomatous polyp 03/1995     Anxiety      ED (erectile dysfunction)      Herpes simplex      HTN (hypertension)      Hyperlipidemia LDL goal < 130 05/2004     Renal stones 08/1994     Tremors 1980     Patient Active Problem List   Diagnosis     Hyperlipidemia with target LDL less than 130     HTN (hypertension)     Renal stone     Cataract   Significant tremors    FAMILY HISTORY  Family History   Problem Relation Age of Onset     Hypertension Mother         d. 80     Parkinsonism Mother      Asthma Son      C.A.D. Father         MI d. 68     Obesity  "Daughter    Family history suggestive of heart attack, tremors    SOCIAL HISTORY  Social History     Tobacco Use     Smoking status: Never Smoker     Smokeless tobacco: Never Used   Substance Use Topics     Alcohol use: Yes     Comment: on occasion     Drug use: No       SYSTEMS REVIEW  Twelve-system ROS was done and other than the HPI this was negative except for tremors  Reports no other new issues today.    MEDICATIONS  allopurinol (ZYLOPRIM) 300 MG tablet, Take 150 mg by mouth  aspirin (ASA) 81 MG chewable tablet, Take 81 mg by mouth  chlorthalidone (HYGROTON) 25 MG tablet, Take 1 tablet (25 mg) by mouth every morning  Multiple Vitamin (MULTIVITAMIN PO), Take  by mouth.  potassium citrate (UROCIT-K) 10 MEQ (1080 MG) SR tablet, Take 1 tablet by mouth daily. Please schedule an appointment for future refills  pravastatin (PRAVACHOL) 40 MG tablet, Take 1 tablet by mouth At Bedtime.  tamsulosin (FLOMAX) 0.4 MG capsule, Take 0.4 mg by mouth    No current facility-administered medications on file prior to visit.       PHYSICAL EXAMINATION  VITALS: BP (!) 147/74 (BP Location: Right arm, Patient Position: Sitting)   Pulse 65   Ht 1.778 m (5' 10\")   Wt 83.5 kg (184 lb)   BMI 26.40 kg/m    GENERAL: Healthy appearing, alert, no acute distress, normal habitus.  CARDIOVASCULAR: Extremities warm and well perfused. Pulses present.   NEUROLOGICAL:  Patient is awake and oriented to self, place and time.  Attention span is normal.  Memory is grossly intact.  Language is fluent and follows commands appropriately.  Appropriate fund of knowledge. Cranial nerves 2-12 are intact. There is no pronator drift.  Motor exam shows 5/5 strength in all extremities.  Tone is symmetric bilaterally in upper and lower extremities.  (Previously reflexes are symmetric and 2+ brisk in upper extremities and lower extremities. Sensory exam is grossly intact to light touch, pin prick and vibration with decreased vibration in the right leg at the " ankle.) Finger to nose and heel to shin is without dysmetria.  Romberg is negative.  Gait is normal and the patient is able to do tandem walk and walk on toes and heels.  On drawing concentric circles he does have significant tremor in both upper extremities.  Postural tremor is seen on exam as well.  No significant improvement noted with the propanolol.    DIAGNOSTICS  RELEVANT LABS  CMP and CBC non-contributory    OUTSIDE RECORDS  Outside referral notes and chart notes were reviewed and pertinent information has been summarized (in addition to the HPI):-Seen in primary care.  Goes to to [place]  every year.  He has had the Covid shot.  Had recently had some blood testing done.    LABS  Component      Latest Ref Rng & Units 6/10/2021           2:58 PM   Sodium      136 - 145 mmol/L 139   Potassium      3.5 - 5.0 mmol/L 3.9   Chloride      98 - 107 mmol/L 105   CO2      22 - 31 mmol/L 25   Anion Gap, Calculation      5 - 18 mmol/L 9   Glucose      70 - 125 mg/dL 89   BUN      8 - 28 mg/dL 21   Creatinine      0.70 - 1.30 mg/dL 1.09   GFR MDRD Af Amer      >60 mL/min/1.73m2 >60   GFR MDRD Non Af Amer      >60 mL/min/1.73m2 >60   Bilirubin, Total      0.0 - 1.0 mg/dL 0.7   Calcium      8.5 - 10.5 mg/dL 9.5   Protein, Total      6.0 - 8.0 g/dL 7.2   ALBUMIN      3.5 - 5.0 g/dL 4.2   Alkaline Phosphatase      45 - 120 U/L 75   AST      0 - 40 U/L 24   ALT      0 - 45 U/L 16   Albumin %      51.0 - 67.0 %    Albumin       3.2 - 4.7 g/dL    Alpha 1 %      2.0 - 4.0 %    Alpha 1      0.1 - 0.3 g/dL    Alpha 2 %      5.0 - 13.0 %    Alpha 2      0.4 - 0.9 g/dL    % Beta      10.0 - 17.0 %    Beta      0.7 - 1.2 g/dL    Gamma Globulin %      9.0 - 20.0 %    Gamma Globulin      0.6 - 1.4 g/dL    ELP Comment          Path ICD:          Interpreted By:          Immunofixation Electrophoresis, Serum          TSH      0.30 - 5.00 uIU/mL 2.42   Vitamin B-12      213 - 816 pg/mL 555   Vitamin B1, Whole Blood      70 - 180 nmol/L  186 (H)   Copper, Serum/Plasma      70.0 - 140.0 ug/dL 107.8   Ceruloplasmin      20 - 60 mg/dL 23     Component      Latest Ref Rng & Units 6/10/2021           2:58 PM   Sodium      136 - 145 mmol/L    Potassium      3.5 - 5.0 mmol/L    Chloride      98 - 107 mmol/L    CO2      22 - 31 mmol/L    Anion Gap, Calculation      5 - 18 mmol/L    Glucose      70 - 125 mg/dL    BUN      8 - 28 mg/dL    Creatinine      0.70 - 1.30 mg/dL    GFR MDRD Af Amer      >60 mL/min/1.73m2    GFR MDRD Non Af Amer      >60 mL/min/1.73m2    Bilirubin, Total      0.0 - 1.0 mg/dL    Calcium      8.5 - 10.5 mg/dL    Protein, Total      6.0 - 8.0 g/dL 6.9   ALBUMIN      3.5 - 5.0 g/dL    Alkaline Phosphatase      45 - 120 U/L    AST      0 - 40 U/L    ALT      0 - 45 U/L    Albumin %      51.0 - 67.0 % 65.8   Albumin       3.2 - 4.7 g/dL 4.5   Alpha 1 %      2.0 - 4.0 % 2.2   Alpha 1      0.1 - 0.3 g/dL 0.2   Alpha 2 %      5.0 - 13.0 % 9.1   Alpha 2      0.4 - 0.9 g/dL 0.6   % Beta      10.0 - 17.0 % 9.5 (L)   Beta      0.7 - 1.2 g/dL 0.7   Gamma Globulin %      9.0 - 20.0 % 13.4   Gamma Globulin      0.6 - 1.4 g/dL 0.9   ELP Comment       Unremarkable protein electrophoresis.   Path ICD:       G25.0   Interpreted By:       Zana Maddox MD   Immunofixation Electrophoresis, Serum          TSH      0.30 - 5.00 uIU/mL    Vitamin B-12      213 - 816 pg/mL    Vitamin B1, Whole Blood      70 - 180 nmol/L    Copper, Serum/Plasma      70.0 - 140.0 ug/dL    Ceruloplasmin      20 - 60 mg/dL      Component      Latest Ref Rng & Units 6/10/2021           2:58 PM   Sodium      136 - 145 mmol/L    Potassium      3.5 - 5.0 mmol/L    Chloride      98 - 107 mmol/L    CO2      22 - 31 mmol/L    Anion Gap, Calculation      5 - 18 mmol/L    Glucose      70 - 125 mg/dL    BUN      8 - 28 mg/dL    Creatinine      0.70 - 1.30 mg/dL    GFR MDRD Af Amer      >60 mL/min/1.73m2    GFR MDRD Non Af Amer      >60 mL/min/1.73m2    Bilirubin, Total      0.0 - 1.0  mg/dL    Calcium      8.5 - 10.5 mg/dL    Protein, Total      6.0 - 8.0 g/dL    ALBUMIN      3.5 - 5.0 g/dL    Alkaline Phosphatase      45 - 120 U/L    AST      0 - 40 U/L    ALT      0 - 45 U/L    Albumin %      51.0 - 67.0 %    Albumin       3.2 - 4.7 g/dL    Alpha 1 %      2.0 - 4.0 %    Alpha 1      0.1 - 0.3 g/dL    Alpha 2 %      5.0 - 13.0 %    Alpha 2      0.4 - 0.9 g/dL    % Beta      10.0 - 17.0 %    Beta      0.7 - 1.2 g/dL    Gamma Globulin %      9.0 - 20.0 %    Gamma Globulin      0.6 - 1.4 g/dL    ELP Comment          Path ICD:       G25.0   Interpreted By:       Zana Maddox MD   Immunofixation Electrophoresis, Serum       Unremarkable immunofixation electrophoresis.   TSH      0.30 - 5.00 uIU/mL    Vitamin B-12      213 - 816 pg/mL    Vitamin B1, Whole Blood      70 - 180 nmol/L    Copper, Serum/Plasma      70.0 - 140.0 ug/dL    Ceruloplasmin      20 - 60 mg/dL    MG panel negative    IMPRESSION/REPORT/PLAN  Diplopia  Essential tremor  Hyperreflexia   Decreased vibratory sense  History of kidney stones  Coronary artery disease  Question of anxiety making tremors worse    This is a 81 year old male tremor suggestive of essential tremor.  Blood work is negative for cause of tremors.  Primidone caused side effects of lightheadedness.  Topamax cannot be used because of history of kidney stones.  He is currently on metoprolol which is stopped.  This is used for coronary artery disease.  He was on 25 mg twice a day of regular metoprolol.  With switching him to propanolol he reports some somnolence with no improvement in the tremor.  He could only try the 20 mg twice a day dosing.  At this point we will switch him back to metoprolol.  Could retry propanolol in April when he is back in [place] or could consider trying gabapentin at that point.    Him reporting that the tremors are related to anxiety-could consider an anxiety medication to see how much he improves    Patient does have some  intermittent episodes of diplopia.  This could be related to myasthenia gravis since they are more prominent when he is more tired.  Myasthenia gravis panel was negative.  We will continue to monitor.  This is stable today.  Could consider an MRI of the brain.    On examination patient has decreased vibratory sense in the right foot along with hyperreflexia.  Is possible he has cervical spinal stenosis versus other lesions.  It is unclear if this is related to the tremor or not.  Most likely this is not related to the tremors.  Could check a MRI of the cervical spine down the road we will hold off for right now per patient request.    -     metoprolol tartrate (LOPRESSOR) 25 MG tablet; Take 1 tablet (25 mg) by mouth 2 times daily      Return in about 9 months (around 6/1/2022) for In-Clinic Visit.    Over 30 minutes were spent coordinating the care for the patient on the day of the encounter.  This includes previsit, during visit and post visit activities as documented above.    Discussion between metoprolol and propanolol and deciding on medication.  2 unstable problem with prescription management.  (Activities include but not inclusive of reviewing chart, reviewing outside records, reviewing labs and imaging study results as well as the images, patient visit time including getting history and exam,  use if applicable, review of test results with the patient and coming up with a plan in a shared model, counseling patient and family, education and answering patient questions, EMR , EMR diagnosis entry and problem list management, medication reconciliation and prescription management if applicable, paperwork if applicable, printing documents and documentation of the visit activities.)    Gabe Rosa MD  Neurologist  [facility]  Tel:- [phone]    This note was dictated using voice recognition software.  Any grammatical or context distortions are  unintentional and inherent to the software.        Again, thank you for allowing me to participate in the care of your patient.        Sincerely,        Gabe Rosa MD

## 2021-09-26 ENCOUNTER — HEALTH MAINTENANCE LETTER (OUTPATIENT)
Age: 81
End: 2021-09-26

## 2022-05-08 ENCOUNTER — HEALTH MAINTENANCE LETTER (OUTPATIENT)
Age: 82
End: 2022-05-08

## 2022-05-09 ENCOUNTER — TELEPHONE (OUTPATIENT)
Dept: NEUROLOGY | Facility: CLINIC | Age: 82
End: 2022-05-09

## 2022-05-09 ENCOUNTER — OFFICE VISIT (OUTPATIENT)
Dept: NEUROLOGY | Facility: CLINIC | Age: 82
End: 2022-05-09
Payer: MEDICARE

## 2022-05-09 VITALS
HEART RATE: 57 BPM | WEIGHT: 186 LBS | BODY MASS INDEX: 26.04 KG/M2 | SYSTOLIC BLOOD PRESSURE: 146 MMHG | DIASTOLIC BLOOD PRESSURE: 80 MMHG | HEIGHT: 71 IN

## 2022-05-09 DIAGNOSIS — R20.8 DECREASED VIBRATORY SENSE: ICD-10-CM

## 2022-05-09 DIAGNOSIS — H53.2 DIPLOPIA: ICD-10-CM

## 2022-05-09 DIAGNOSIS — G25.0 ESSENTIAL TREMOR: Primary | ICD-10-CM

## 2022-05-09 DIAGNOSIS — R29.2 HYPERREFLEXIA: ICD-10-CM

## 2022-05-09 PROCEDURE — 99214 OFFICE O/P EST MOD 30 MIN: CPT | Performed by: PSYCHIATRY & NEUROLOGY

## 2022-05-09 RX ORDER — PRIMIDONE 50 MG/1
TABLET ORAL
Qty: 60 TABLET | Refills: 1 | Status: SHIPPED | OUTPATIENT
Start: 2022-05-09 | End: 2022-06-20

## 2022-05-09 RX ORDER — PRIMIDONE 50 MG/1
TABLET ORAL
Qty: 180 TABLET | Refills: 0 | Status: SHIPPED | OUTPATIENT
Start: 2022-05-09 | End: 2022-05-09

## 2022-05-09 NOTE — TELEPHONE ENCOUNTER
[facility] Call Center    Phone Message    May a detailed message be left on voicemail: yes     Reason for Call: Medication Question or concern regarding medication   Prescription Clarification  Name of Medication: primidone (MYSOLINE) 50 MG tablet  Prescribing Provider: Dr. Rosa   Pharmacy: [facility]ACY #[id number] - [facility], [place] - [address] [place], [place] [address] [address] [facility]   What on the order needs clarification? Pt called and stated that he is unsure what this medication is replacing and what he needs to stop taking. Please call Pt back with further information.           Action Taken: Message routed to:  [facility] ([facility]    Travel Screening: Not Applicable

## 2022-05-09 NOTE — PROGRESS NOTES
NEUROLOGY OUTPATIENT PROGRESS NOTE   May 9, 2022     CHIEF COMPLAINT/REASON FOR VISIT/REASON FOR CONSULT  Patient presents with:  RECHECK: Tremors.    REASON FOR CONSULTATION- Tremors    HISTORY OF PRESENT ILLNESS  Willis Soni is a 81 year old male seen today for evaluation of tremors.  He reports that he has had tremors for the last 20 years.  They have progressively gotten worse.  He does have family history of tremors including his brother sister and mother.  Tremors are mainly prominent when he is trying to eat.  Tremors are not bothersome at rest.  Reports no significant parkinsonian symptoms.  No memory problems.  No difficulty keeping up with other people.  Denies any balance issues.  Denies any weakness in the arms.  Tremors are present in both upper extremities.  Has not tried any medications for this.    He does complain of episodes of double vision.  These are brought on by a staring on the television louder when he strongly uses hands and eyes to complete a jigsaw puzzle.  Has had cataract surgery in the past.  Closing one eye does improve the double vision.    7/14/21  Patient returns today.  He did try the primidone and try to cut down on the dose but continued to have lightheadedness.  He is currently off the medication.  Tremors are about the same.  Reports that the tremors only the revised to something.  Tremors have been there for several years.  He is on metoprolol for his heart.  The dosage recorded in the computer was incorrect and is taking 25 mg twice a day.  Took some time to clarify what dose he was taking.  Topamax was also discussed with him though he does have a history of kidney stones and initially prescribed though later was discontinued.    Patient does have some hyperreflexia on examination with some spells of double vision.  We talked about looking into this further and he wants to hold off on that for right now since this is unrelated to his tremors.  He will be traveling to  [place] in September.  Will be back in April.    9/1/21  Patient returns today.  He was put on propanolol previously instead of the metoprolol that he was taking for his coronary artery disease.  The propanolol he could only tolerate 20 mg twice a day.  Reports that it did not really help the tremor.  He continues to have tremor intermittently.  Reports that when he is anxious the tremors are worse.  Continues to have tremors when he is trying to eat or write.  Reports a lot of somnolence with the medication though the wife feels that he was already somnolent even before the medication.  After long discussion was decided to go back to the medication he was taking.    Continues to have double vision.  No significant issues with balance.  Is traveling to [place] and will be back in April.  Wants to go back to the previous metoprolol that he was taking.  Might want to see a neurologist at [facility] when he is down there.    5/9/22  Patient returns today.  Complains of ongoing lightheadedness.  Is only taking the metoprolol at this point.  Cannot tell me if the medication is making him lightheaded or not.  Feels that the tremors are really bad.  Mainly there with activity.  No balance issues.  Is still a bit slow to walk but nothing major.  No other new concerns.  Wants to retry the primidone that we had previously tried.    Previous history is reviewed and this is unchanged.    PAST MEDICAL/SURGICAL HISTORY  Past Medical History:   Diagnosis Date     Adenomatous polyp 03/1995     Anxiety      ED (erectile dysfunction)      Herpes simplex      HTN (hypertension)      Hyperlipidemia LDL goal < 130 05/2004     Renal stones 08/1994     Tremors 1980     Patient Active Problem List   Diagnosis     Hyperlipidemia with target LDL less than 130     HTN (hypertension)     Renal stone     Cataract   Significant tremors    FAMILY HISTORY  Family History   Problem Relation Age of Onset     Hypertension Mother         d.  "80     Parkinsonism Mother      Asthma Son      C.A.D. Father         MI d. 68     Obesity Daughter    Family history suggestive of heart attack, tremors    SOCIAL HISTORY  Social History     Tobacco Use     Smoking status: Never Smoker     Smokeless tobacco: Never Used   Substance Use Topics     Alcohol use: Yes     Comment: on occasion     Drug use: No       SYSTEMS REVIEW  Twelve-system ROS was done and other than the HPI this was negative except for tremors  No new issues/concerns today.    MEDICATIONS  allopurinol (ZYLOPRIM) 300 MG tablet, Take 150 mg by mouth  aspirin (ASA) 81 MG chewable tablet, Take 81 mg by mouth  metoprolol tartrate (LOPRESSOR) 25 MG tablet, Take 1 tablet (25 mg) by mouth 2 times daily  Multiple Vitamin (MULTIVITAMIN PO), Take  by mouth.  potassium citrate (UROCIT-K) 10 MEQ (1080 MG) SR tablet, Take 1 tablet by mouth daily. Please schedule an appointment for future refills  pravastatin (PRAVACHOL) 40 MG tablet, Take 1 tablet by mouth At Bedtime.  tamsulosin (FLOMAX) 0.4 MG capsule, Take 0.4 mg by mouth  chlorthalidone (HYGROTON) 25 MG tablet, Take 1 tablet (25 mg) by mouth every morning (Patient not taking: Reported on 5/9/2022)    No current facility-administered medications on file prior to visit.       PHYSICAL EXAMINATION  VITALS: BP (!) 146/80 (BP Location: Right arm, Patient Position: Sitting)   Pulse 57   Ht 1.791 m (5' 10.5\")   Wt 84.4 kg (186 lb)   BMI 26.31 kg/m    GENERAL: Healthy appearing, alert, no acute distress, normal habitus.  CARDIOVASCULAR: Extremities warm and well perfused. Pulses present.   NEUROLOGICAL:  Patient is awake and oriented to self, place and time.  Attention span is normal.  Memory is grossly intact.  Language is fluent and follows commands appropriately.  Appropriate fund of knowledge. Cranial nerves 2-12 are intact. There is no pronator drift.  Motor exam shows 5/5 strength in all extremities.  Tone is symmetric bilaterally in upper and lower " extremities.  (Previously reflexes are symmetric and 2+ brisk in upper extremities and lower extremities. Sensory exam is grossly intact to light touch, pin prick and vibration with decreased vibration in the right leg at the ankle.) Finger to nose and heel to shin is without dysmetria.  Romberg is negative.  Gait is normal with slight decreased arm swing in the right upper extremity and the patient is able to do tandem walk and walk on toes and heels.  On drawing concentric circles he does have significant tremor in both upper extremities.  Postural tremor is seen on exam as well.    Mild right resting tremor is noted.  Exam overall unchanged compared to before.      DIAGNOSTICS  RELEVANT LABS  CMP and CBC non-contributory    OUTSIDE RECORDS  Outside referral notes and chart notes were reviewed and pertinent information has been summarized (in addition to the HPI):-Seen in primary care.  Goes to to [place]  every year.  He has had the Covid shot.  Had recently had some blood testing done.    LABS  Component      Latest Ref Rng & Units 6/10/2021           2:58 PM   Sodium      136 - 145 mmol/L 139   Potassium      3.5 - 5.0 mmol/L 3.9   Chloride      98 - 107 mmol/L 105   CO2      22 - 31 mmol/L 25   Anion Gap, Calculation      5 - 18 mmol/L 9   Glucose      70 - 125 mg/dL 89   BUN      8 - 28 mg/dL 21   Creatinine      0.70 - 1.30 mg/dL 1.09   GFR MDRD Af Amer      >60 mL/min/1.73m2 >60   GFR MDRD Non Af Amer      >60 mL/min/1.73m2 >60   Bilirubin, Total      0.0 - 1.0 mg/dL 0.7   Calcium      8.5 - 10.5 mg/dL 9.5   Protein, Total      6.0 - 8.0 g/dL 7.2   ALBUMIN      3.5 - 5.0 g/dL 4.2   Alkaline Phosphatase      45 - 120 U/L 75   AST      0 - 40 U/L 24   ALT      0 - 45 U/L 16   Albumin %      51.0 - 67.0 %    Albumin       3.2 - 4.7 g/dL    Alpha 1 %      2.0 - 4.0 %    Alpha 1      0.1 - 0.3 g/dL    Alpha 2 %      5.0 - 13.0 %    Alpha 2      0.4 - 0.9 g/dL    % Beta      10.0 - 17.0 %    Beta      0.7 - 1.2  g/dL    Gamma Globulin %      9.0 - 20.0 %    Gamma Globulin      0.6 - 1.4 g/dL    ELP Comment          Path ICD:          Interpreted By:          Immunofixation Electrophoresis, Serum          TSH      0.30 - 5.00 uIU/mL 2.42   Vitamin B-12      213 - 816 pg/mL 555   Vitamin B1, Whole Blood      70 - 180 nmol/L 186 (H)   Copper, Serum/Plasma      70.0 - 140.0 ug/dL 107.8   Ceruloplasmin      20 - 60 mg/dL 23     Component      Latest Ref Rng & Units 6/10/2021           2:58 PM   Sodium      136 - 145 mmol/L    Potassium      3.5 - 5.0 mmol/L    Chloride      98 - 107 mmol/L    CO2      22 - 31 mmol/L    Anion Gap, Calculation      5 - 18 mmol/L    Glucose      70 - 125 mg/dL    BUN      8 - 28 mg/dL    Creatinine      0.70 - 1.30 mg/dL    GFR MDRD Af Amer      >60 mL/min/1.73m2    GFR MDRD Non Af Amer      >60 mL/min/1.73m2    Bilirubin, Total      0.0 - 1.0 mg/dL    Calcium      8.5 - 10.5 mg/dL    Protein, Total      6.0 - 8.0 g/dL 6.9   ALBUMIN      3.5 - 5.0 g/dL    Alkaline Phosphatase      45 - 120 U/L    AST      0 - 40 U/L    ALT      0 - 45 U/L    Albumin %      51.0 - 67.0 % 65.8   Albumin       3.2 - 4.7 g/dL 4.5   Alpha 1 %      2.0 - 4.0 % 2.2   Alpha 1      0.1 - 0.3 g/dL 0.2   Alpha 2 %      5.0 - 13.0 % 9.1   Alpha 2      0.4 - 0.9 g/dL 0.6   % Beta      10.0 - 17.0 % 9.5 (L)   Beta      0.7 - 1.2 g/dL 0.7   Gamma Globulin %      9.0 - 20.0 % 13.4   Gamma Globulin      0.6 - 1.4 g/dL 0.9   ELP Comment       Unremarkable protein electrophoresis.   Path ICD:       G25.0   Interpreted By:       Zana Maddox MD   Immunofixation Electrophoresis, Serum          TSH      0.30 - 5.00 uIU/mL    Vitamin B-12      213 - 816 pg/mL    Vitamin B1, Whole Blood      70 - 180 nmol/L    Copper, Serum/Plasma      70.0 - 140.0 ug/dL    Ceruloplasmin      20 - 60 mg/dL      Component      Latest Ref Rng & Units 6/10/2021           2:58 PM   Sodium      136 - 145 mmol/L    Potassium      3.5 - 5.0 mmol/L     Chloride      98 - 107 mmol/L    CO2      22 - 31 mmol/L    Anion Gap, Calculation      5 - 18 mmol/L    Glucose      70 - 125 mg/dL    BUN      8 - 28 mg/dL    Creatinine      0.70 - 1.30 mg/dL    GFR MDRD Af Amer      >60 mL/min/1.73m2    GFR MDRD Non Af Amer      >60 mL/min/1.73m2    Bilirubin, Total      0.0 - 1.0 mg/dL    Calcium      8.5 - 10.5 mg/dL    Protein, Total      6.0 - 8.0 g/dL    ALBUMIN      3.5 - 5.0 g/dL    Alkaline Phosphatase      45 - 120 U/L    AST      0 - 40 U/L    ALT      0 - 45 U/L    Albumin %      51.0 - 67.0 %    Albumin       3.2 - 4.7 g/dL    Alpha 1 %      2.0 - 4.0 %    Alpha 1      0.1 - 0.3 g/dL    Alpha 2 %      5.0 - 13.0 %    Alpha 2      0.4 - 0.9 g/dL    % Beta      10.0 - 17.0 %    Beta      0.7 - 1.2 g/dL    Gamma Globulin %      9.0 - 20.0 %    Gamma Globulin      0.6 - 1.4 g/dL    ELP Comment          Path ICD:       G25.0   Interpreted By:       Zana Maddox MD   Immunofixation Electrophoresis, Serum       Unremarkable immunofixation electrophoresis.   TSH      0.30 - 5.00 uIU/mL    Vitamin B-12      213 - 816 pg/mL    Vitamin B1, Whole Blood      70 - 180 nmol/L    Copper, Serum/Plasma      70.0 - 140.0 ug/dL    Ceruloplasmin      20 - 60 mg/dL    MG panel negative    IMPRESSION/REPORT/PLAN  Diplopia  Essential tremor  Hyperreflexia   Decreased vibratory sense  History of kidney stones  Coronary artery disease  Question of anxiety making tremors worse    This is a 81 year old male tremor suggestive of essential tremor.  Blood work is negative for cause of tremors.  Primidone caused side effects of lightheadedness.  Topamax cannot be used because of history of kidney stones.  He is currently on metoprolol which is stopped.  This is used for coronary artery disease.  He was on 25 mg twice a day of regular metoprolol.  With switching him to propanolol he reports some somnolence with no improvement in the tremor.      He is now back on the metoprolol though does  complain of lightheadedness.  Wants to try the primidone 1 more time to see if that would help.  Tremors are really bothersome to him.  Discussed about trying gabapentin as well.  Wants to use the primidone mainly at night and see if it will help during the daytime since his main side effect was somnolence.    On exam he might have a mild resting tremor in the right upper extremity with some decreased arm swing of the right side.  Discussed about possibility of parkinsonism.  Could consider Parkinson's medication to see if that further helps his symptoms.    Him reporting that the tremors are related to anxiety-could consider an anxiety medication to see how much he improves    Patient does have some intermittent episodes of diplopia.  This could be related to myasthenia gravis since they are more prominent when he is more tired.  Myasthenia gravis panel was negative.  We will continue to monitor.  This is stable today.  Could consider an MRI of the brain.    On examination patient has decreased vibratory sense in the right foot along with hyperreflexia.  Is possible he has cervical spinal stenosis versus other lesions.  It is unclear if this is related to the tremor or not.  Most likely this is not related to the tremors.  Could check a MRI of the cervical spine down the road we will hold off for right now per patient request.    I can see him back in 6 weeks.    -     metoprolol tartrate (LOPRESSOR) 25 MG tablet; Take 1 tablet (25 mg) by mouth 2 times daily  -     primidone (MYSOLINE) 50 MG tablet; Take 0.5 tabs BID for 1 week and then increase to 1 tabs BID as tolerated and needed.    Return in about 6 weeks (around 6/20/2022) for In-Clinic Visit (must).    Over 34 minutes were spent coordinating the care for the patient on the day of the encounter.  This includes previsit, during visit and post visit activities as documented above.    Counseling patient.  Prescription management.  Multiple problems  reviewed/addressed.  (Activities include but not inclusive of reviewing chart, reviewing outside records, reviewing labs and imaging study results as well as the images, patient visit time including getting history and exam,  use if applicable, review of test results with the patient and coming up with a plan in a shared model, counseling patient and family, education and answering patient questions, EMR , EMR diagnosis entry and problem list management, medication reconciliation and prescription management if applicable, paperwork if applicable, printing documents and documentation of the visit activities.)    Gabe Rosa MD  Neurologist  [facility]  Tel:- [phone]    This note was dictated using voice recognition software.  Any grammatical or context distortions are unintentional and inherent to the software.

## 2022-05-09 NOTE — LETTER
5/9/2022         RE: Willis Soni  [address]  [facility] [address]        Dear Colleague,    Thank you for referring your patient, Willis Soni, to the [facility] NEUROLOGY [facility] [place]. Please see a copy of my visit note below.    NEUROLOGY OUTPATIENT PROGRESS NOTE   May 9, 2022     CHIEF COMPLAINT/REASON FOR VISIT/REASON FOR CONSULT  Patient presents with:  RECHECK: Tremors.    REASON FOR CONSULTATION- Tremors    HISTORY OF PRESENT ILLNESS  Willis Soni is a 81 year old male seen today for evaluation of tremors.  He reports that he has had tremors for the last 20 years.  They have progressively gotten worse.  He does have family history of tremors including his brother sister and mother.  Tremors are mainly prominent when he is trying to eat.  Tremors are not bothersome at rest.  Reports no significant parkinsonian symptoms.  No memory problems.  No difficulty keeping up with other people.  Denies any balance issues.  Denies any weakness in the arms.  Tremors are present in both upper extremities.  Has not tried any medications for this.    He does complain of episodes of double vision.  These are brought on by a staring on the television louder when he strongly uses hands and eyes to complete a jigsaw puzzle.  Has had cataract surgery in the past.  Closing one eye does improve the double vision.    7/14/21  Patient returns today.  He did try the primidone and try to cut down on the dose but continued to have lightheadedness.  He is currently off the medication.  Tremors are about the same.  Reports that the tremors only the revised to something.  Tremors have been there for several years.  He is on metoprolol for his heart.  The dosage recorded in the computer was incorrect and is taking 25 mg twice a day.  Took some time to clarify what dose he was taking.  Topamax was also discussed with him though he does have a history of kidney stones and initially prescribed though later was  discontinued.    Patient does have some hyperreflexia on examination with some spells of double vision.  We talked about looking into this further and he wants to hold off on that for right now since this is unrelated to his tremors.  He will be traveling to [place] in September.  Will be back in April.    9/1/21  Patient returns today.  He was put on propanolol previously instead of the metoprolol that he was taking for his coronary artery disease.  The propanolol he could only tolerate 20 mg twice a day.  Reports that it did not really help the tremor.  He continues to have tremor intermittently.  Reports that when he is anxious the tremors are worse.  Continues to have tremors when he is trying to eat or write.  Reports a lot of somnolence with the medication though the wife feels that he was already somnolent even before the medication.  After long discussion was decided to go back to the medication he was taking.    Continues to have double vision.  No significant issues with balance.  Is traveling to [place] and will be back in April.  Wants to go back to the previous metoprolol that he was taking.  Might want to see a neurologist at [facility] when he is down there.    5/9/22  Patient returns today.  Complains of ongoing lightheadedness.  Is only taking the metoprolol at this point.  Cannot tell me if the medication is making him lightheaded or not.  Feels that the tremors are really bad.  Mainly there with activity.  No balance issues.  Is still a bit slow to walk but nothing major.  No other new concerns.  Wants to retry the primidone that we had previously tried.    Previous history is reviewed and this is unchanged.    PAST MEDICAL/SURGICAL HISTORY  Past Medical History:   Diagnosis Date     Adenomatous polyp 03/1995     Anxiety      ED (erectile dysfunction)      Herpes simplex      HTN (hypertension)      Hyperlipidemia LDL goal < 130 05/2004     Renal stones 08/1994     Tremors 1980  "    Patient Active Problem List   Diagnosis     Hyperlipidemia with target LDL less than 130     HTN (hypertension)     Renal stone     Cataract   Significant tremors    FAMILY HISTORY  Family History   Problem Relation Age of Onset     Hypertension Mother         d. 80     Parkinsonism Mother      Asthma Son      C.A.D. Father         MI d. 68     Obesity Daughter    Family history suggestive of heart attack, tremors    SOCIAL HISTORY  Social History     Tobacco Use     Smoking status: Never Smoker     Smokeless tobacco: Never Used   Substance Use Topics     Alcohol use: Yes     Comment: on occasion     Drug use: No       SYSTEMS REVIEW  Twelve-system ROS was done and other than the HPI this was negative except for tremors  No new issues/concerns today.    MEDICATIONS  allopurinol (ZYLOPRIM) 300 MG tablet, Take 150 mg by mouth  aspirin (ASA) 81 MG chewable tablet, Take 81 mg by mouth  metoprolol tartrate (LOPRESSOR) 25 MG tablet, Take 1 tablet (25 mg) by mouth 2 times daily  Multiple Vitamin (MULTIVITAMIN PO), Take  by mouth.  potassium citrate (UROCIT-K) 10 MEQ (1080 MG) SR tablet, Take 1 tablet by mouth daily. Please schedule an appointment for future refills  pravastatin (PRAVACHOL) 40 MG tablet, Take 1 tablet by mouth At Bedtime.  tamsulosin (FLOMAX) 0.4 MG capsule, Take 0.4 mg by mouth  chlorthalidone (HYGROTON) 25 MG tablet, Take 1 tablet (25 mg) by mouth every morning (Patient not taking: Reported on 5/9/2022)    No current facility-administered medications on file prior to visit.       PHYSICAL EXAMINATION  VITALS: BP (!) 146/80 (BP Location: Right arm, Patient Position: Sitting)   Pulse 57   Ht 1.791 m (5' 10.5\")   Wt 84.4 kg (186 lb)   BMI 26.31 kg/m    GENERAL: Healthy appearing, alert, no acute distress, normal habitus.  CARDIOVASCULAR: Extremities warm and well perfused. Pulses present.   NEUROLOGICAL:  Patient is awake and oriented to self, place and time.  Attention span is normal.  Memory is " grossly intact.  Language is fluent and follows commands appropriately.  Appropriate fund of knowledge. Cranial nerves 2-12 are intact. There is no pronator drift.  Motor exam shows 5/5 strength in all extremities.  Tone is symmetric bilaterally in upper and lower extremities.  (Previously reflexes are symmetric and 2+ brisk in upper extremities and lower extremities. Sensory exam is grossly intact to light touch, pin prick and vibration with decreased vibration in the right leg at the ankle.) Finger to nose and heel to shin is without dysmetria.  Romberg is negative.  Gait is normal with slight decreased arm swing in the right upper extremity and the patient is able to do tandem walk and walk on toes and heels.  On drawing concentric circles he does have significant tremor in both upper extremities.  Postural tremor is seen on exam as well.    Mild right resting tremor is noted.  Exam overall unchanged compared to before.      DIAGNOSTICS  RELEVANT LABS  CMP and CBC non-contributory    OUTSIDE RECORDS  Outside referral notes and chart notes were reviewed and pertinent information has been summarized (in addition to the HPI):-Seen in primary care.  Goes to to [place]  every year.  He has had the Covid shot.  Had recently had some blood testing done.    LABS  Component      Latest Ref Rng & Units 6/10/2021           2:58 PM   Sodium      136 - 145 mmol/L 139   Potassium      3.5 - 5.0 mmol/L 3.9   Chloride      98 - 107 mmol/L 105   CO2      22 - 31 mmol/L 25   Anion Gap, Calculation      5 - 18 mmol/L 9   Glucose      70 - 125 mg/dL 89   BUN      8 - 28 mg/dL 21   Creatinine      0.70 - 1.30 mg/dL 1.09   GFR MDRD Af Amer      >60 mL/min/1.73m2 >60   GFR MDRD Non Af Amer      >60 mL/min/1.73m2 >60   Bilirubin, Total      0.0 - 1.0 mg/dL 0.7   Calcium      8.5 - 10.5 mg/dL 9.5   Protein, Total      6.0 - 8.0 g/dL 7.2   ALBUMIN      3.5 - 5.0 g/dL 4.2   Alkaline Phosphatase      45 - 120 U/L 75   AST      0 - 40 U/L 24    ALT      0 - 45 U/L 16   Albumin %      51.0 - 67.0 %    Albumin       3.2 - 4.7 g/dL    Alpha 1 %      2.0 - 4.0 %    Alpha 1      0.1 - 0.3 g/dL    Alpha 2 %      5.0 - 13.0 %    Alpha 2      0.4 - 0.9 g/dL    % Beta      10.0 - 17.0 %    Beta      0.7 - 1.2 g/dL    Gamma Globulin %      9.0 - 20.0 %    Gamma Globulin      0.6 - 1.4 g/dL    ELP Comment          Path ICD:          Interpreted By:          Immunofixation Electrophoresis, Serum          TSH      0.30 - 5.00 uIU/mL 2.42   Vitamin B-12      213 - 816 pg/mL 555   Vitamin B1, Whole Blood      70 - 180 nmol/L 186 (H)   Copper, Serum/Plasma      70.0 - 140.0 ug/dL 107.8   Ceruloplasmin      20 - 60 mg/dL 23     Component      Latest Ref Rng & Units 6/10/2021           2:58 PM   Sodium      136 - 145 mmol/L    Potassium      3.5 - 5.0 mmol/L    Chloride      98 - 107 mmol/L    CO2      22 - 31 mmol/L    Anion Gap, Calculation      5 - 18 mmol/L    Glucose      70 - 125 mg/dL    BUN      8 - 28 mg/dL    Creatinine      0.70 - 1.30 mg/dL    GFR MDRD Af Amer      >60 mL/min/1.73m2    GFR MDRD Non Af Amer      >60 mL/min/1.73m2    Bilirubin, Total      0.0 - 1.0 mg/dL    Calcium      8.5 - 10.5 mg/dL    Protein, Total      6.0 - 8.0 g/dL 6.9   ALBUMIN      3.5 - 5.0 g/dL    Alkaline Phosphatase      45 - 120 U/L    AST      0 - 40 U/L    ALT      0 - 45 U/L    Albumin %      51.0 - 67.0 % 65.8   Albumin       3.2 - 4.7 g/dL 4.5   Alpha 1 %      2.0 - 4.0 % 2.2   Alpha 1      0.1 - 0.3 g/dL 0.2   Alpha 2 %      5.0 - 13.0 % 9.1   Alpha 2      0.4 - 0.9 g/dL 0.6   % Beta      10.0 - 17.0 % 9.5 (L)   Beta      0.7 - 1.2 g/dL 0.7   Gamma Globulin %      9.0 - 20.0 % 13.4   Gamma Globulin      0.6 - 1.4 g/dL 0.9   ELP Comment       Unremarkable protein electrophoresis.   Path ICD:       G25.0   Interpreted By:       Zana Maddox MD   Immunofixation Electrophoresis, Serum          TSH      0.30 - 5.00 uIU/mL    Vitamin B-12      213 - 816 pg/mL    Vitamin  B1, Whole Blood      70 - 180 nmol/L    Copper, Serum/Plasma      70.0 - 140.0 ug/dL    Ceruloplasmin      20 - 60 mg/dL      Component      Latest Ref Rng & Units 6/10/2021           2:58 PM   Sodium      136 - 145 mmol/L    Potassium      3.5 - 5.0 mmol/L    Chloride      98 - 107 mmol/L    CO2      22 - 31 mmol/L    Anion Gap, Calculation      5 - 18 mmol/L    Glucose      70 - 125 mg/dL    BUN      8 - 28 mg/dL    Creatinine      0.70 - 1.30 mg/dL    GFR MDRD Af Amer      >60 mL/min/1.73m2    GFR MDRD Non Af Amer      >60 mL/min/1.73m2    Bilirubin, Total      0.0 - 1.0 mg/dL    Calcium      8.5 - 10.5 mg/dL    Protein, Total      6.0 - 8.0 g/dL    ALBUMIN      3.5 - 5.0 g/dL    Alkaline Phosphatase      45 - 120 U/L    AST      0 - 40 U/L    ALT      0 - 45 U/L    Albumin %      51.0 - 67.0 %    Albumin       3.2 - 4.7 g/dL    Alpha 1 %      2.0 - 4.0 %    Alpha 1      0.1 - 0.3 g/dL    Alpha 2 %      5.0 - 13.0 %    Alpha 2      0.4 - 0.9 g/dL    % Beta      10.0 - 17.0 %    Beta      0.7 - 1.2 g/dL    Gamma Globulin %      9.0 - 20.0 %    Gamma Globulin      0.6 - 1.4 g/dL    ELP Comment          Path ICD:       G25.0   Interpreted By:       Zana Maddox MD   Immunofixation Electrophoresis, Serum       Unremarkable immunofixation electrophoresis.   TSH      0.30 - 5.00 uIU/mL    Vitamin B-12      213 - 816 pg/mL    Vitamin B1, Whole Blood      70 - 180 nmol/L    Copper, Serum/Plasma      70.0 - 140.0 ug/dL    Ceruloplasmin      20 - 60 mg/dL    MG panel negative    IMPRESSION/REPORT/PLAN  Diplopia  Essential tremor  Hyperreflexia   Decreased vibratory sense  History of kidney stones  Coronary artery disease  Question of anxiety making tremors worse    This is a 81 year old male tremor suggestive of essential tremor.  Blood work is negative for cause of tremors.  Primidone caused side effects of lightheadedness.  Topamax cannot be used because of history of kidney stones.  He is currently on metoprolol  which is stopped.  This is used for coronary artery disease.  He was on 25 mg twice a day of regular metoprolol.  With switching him to propanolol he reports some somnolence with no improvement in the tremor.      He is now back on the metoprolol though does complain of lightheadedness.  Wants to try the primidone 1 more time to see if that would help.  Tremors are really bothersome to him.  Discussed about trying gabapentin as well.  Wants to use the primidone mainly at night and see if it will help during the daytime since his main side effect was somnolence.    On exam he might have a mild resting tremor in the right upper extremity with some decreased arm swing of the right side.  Discussed about possibility of parkinsonism.  Could consider Parkinson's medication to see if that further helps his symptoms.    Him reporting that the tremors are related to anxiety-could consider an anxiety medication to see how much he improves    Patient does have some intermittent episodes of diplopia.  This could be related to myasthenia gravis since they are more prominent when he is more tired.  Myasthenia gravis panel was negative.  We will continue to monitor.  This is stable today.  Could consider an MRI of the brain.    On examination patient has decreased vibratory sense in the right foot along with hyperreflexia.  Is possible he has cervical spinal stenosis versus other lesions.  It is unclear if this is related to the tremor or not.  Most likely this is not related to the tremors.  Could check a MRI of the cervical spine down the road we will hold off for right now per patient request.    I can see him back in 6 weeks.    -     metoprolol tartrate (LOPRESSOR) 25 MG tablet; Take 1 tablet (25 mg) by mouth 2 times daily  -     primidone (MYSOLINE) 50 MG tablet; Take 0.5 tabs BID for 1 week and then increase to 1 tabs BID as tolerated and needed.    Return in about 6 weeks (around 6/20/2022) for In-Clinic Visit  (must).    Over 34 minutes were spent coordinating the care for the patient on the day of the encounter.  This includes previsit, during visit and post visit activities as documented above.    Counseling patient.  Prescription management.  Multiple problems reviewed/addressed.  (Activities include but not inclusive of reviewing chart, reviewing outside records, reviewing labs and imaging study results as well as the images, patient visit time including getting history and exam,  use if applicable, review of test results with the patient and coming up with a plan in a shared model, counseling patient and family, education and answering patient questions, EMR , EMR diagnosis entry and problem list management, medication reconciliation and prescription management if applicable, paperwork if applicable, printing documents and documentation of the visit activities.)    Gabe Rosa MD  Neurologist  [facility] [place] [facility]  Tel:- [phone]    This note was dictated using voice recognition software.  Any grammatical or context distortions are unintentional and inherent to the software.        Again, thank you for allowing me to participate in the care of your patient.        Sincerely,        Gabe Rosa MD

## 2022-05-09 NOTE — NURSING NOTE
Chief Complaint   Patient presents with     RECHECK     Tremors.     Artur Cornell MA on 5/9/2022 at 12:53 PM

## 2022-05-10 NOTE — TELEPHONE ENCOUNTER
Spoke with Willis and his wife to discuss what medications he should be on. RN explained that pt is only being ordered Metoprolol and Primidone by Dr. Rosa. They verbalized understanding.     Marcella Castillo RN on 5/10/2022 at 10:56 AM

## 2022-05-10 NOTE — TELEPHONE ENCOUNTER
[facility] Call Center    Phone Message    May a detailed message be left on voicemail: yes     Reason for Call: Other: Willis calling again to discuss his medications. He stated that he was taking chlorthalidone (HYGROTON) 25 MG tablet and forgot to state that yesterday at his appointment. He is also inquiring what medication that propranolol 25 mg tablets is replacing. Willis is going out of town this weekend and is wanting to get his medication updated prior. Please call Willis at your earliest convenience to discuss.      Action Taken: Message routed to:  Other: [facility] NEUROLOGY    Travel Screening: Not Applicable

## 2022-06-20 ENCOUNTER — OFFICE VISIT (OUTPATIENT)
Dept: NEUROLOGY | Facility: CLINIC | Age: 82
End: 2022-06-20
Payer: MEDICARE

## 2022-06-20 VITALS
RESPIRATION RATE: 16 BRPM | WEIGHT: 183 LBS | HEIGHT: 71 IN | HEART RATE: 60 BPM | SYSTOLIC BLOOD PRESSURE: 142 MMHG | BODY MASS INDEX: 25.62 KG/M2 | DIASTOLIC BLOOD PRESSURE: 80 MMHG

## 2022-06-20 DIAGNOSIS — H53.2 DIPLOPIA: ICD-10-CM

## 2022-06-20 DIAGNOSIS — G25.0 ESSENTIAL TREMOR: Primary | ICD-10-CM

## 2022-06-20 DIAGNOSIS — R20.8 DECREASED VIBRATORY SENSE: ICD-10-CM

## 2022-06-20 DIAGNOSIS — R29.2 HYPERREFLEXIA: ICD-10-CM

## 2022-06-20 PROCEDURE — 99214 OFFICE O/P EST MOD 30 MIN: CPT | Performed by: PSYCHIATRY & NEUROLOGY

## 2022-06-20 RX ORDER — PRIMIDONE 50 MG/1
TABLET ORAL
Qty: 270 TABLET | Refills: 1 | Status: SHIPPED | OUTPATIENT
Start: 2022-06-20 | End: 2022-08-09

## 2022-06-20 NOTE — PROGRESS NOTES
NEUROLOGY OUTPATIENT PROGRESS NOTE   Jun 20, 2022     CHIEF COMPLAINT/REASON FOR VISIT/REASON FOR CONSULT  Patient presents with:  Follow Up    REASON FOR CONSULTATION- Tremors    HISTORY OF PRESENT ILLNESS  Willis Soni is a 82 year old male seen today for evaluation of tremors.  He reports that he has had tremors for the last 20 years.  They have progressively gotten worse.  He does have family history of tremors including his brother sister and mother.  Tremors are mainly prominent when he is trying to eat.  Tremors are not bothersome at rest.  Reports no significant parkinsonian symptoms.  No memory problems.  No difficulty keeping up with other people.  Denies any balance issues.  Denies any weakness in the arms.  Tremors are present in both upper extremities.  Has not tried any medications for this.    He does complain of episodes of double vision.  These are brought on by a staring on the television louder when he strongly uses hands and eyes to complete a jigsaw puzzle.  Has had cataract surgery in the past.  Closing one eye does improve the double vision.    7/14/21  Patient returns today.  He did try the primidone and try to cut down on the dose but continued to have lightheadedness.  He is currently off the medication.  Tremors are about the same.  Reports that the tremors only the revised to something.  Tremors have been there for several years.  He is on metoprolol for his heart.  The dosage recorded in the computer was incorrect and is taking 25 mg twice a day.  Took some time to clarify what dose he was taking.  Topamax was also discussed with him though he does have a history of kidney stones and initially prescribed though later was discontinued.    Patient does have some hyperreflexia on examination with some spells of double vision.  We talked about looking into this further and he wants to hold off on that for right now since this is unrelated to his tremors.  He will be traveling to [place]  in September.  Will be back in April.    9/1/21  Patient returns today.  He was put on propanolol previously instead of the metoprolol that he was taking for his coronary artery disease.  The propanolol he could only tolerate 20 mg twice a day.  Reports that it did not really help the tremor.  He continues to have tremor intermittently.  Reports that when he is anxious the tremors are worse.  Continues to have tremors when he is trying to eat or write.  Reports a lot of somnolence with the medication though the wife feels that he was already somnolent even before the medication.  After long discussion was decided to go back to the medication he was taking.    Continues to have double vision.  No significant issues with balance.  Is traveling to [place] and will be back in April.  Wants to go back to the previous metoprolol that he was taking.  Might want to see a neurologist at [facility] when he is down there.    5/9/22  Patient returns today.  Complains of ongoing lightheadedness.  Is only taking the metoprolol at this point.  Cannot tell me if the medication is making him lightheaded or not.  Feels that the tremors are really bad.  Mainly there with activity.  No balance issues.  Is still a bit slow to walk but nothing major.  No other new concerns.  Wants to retry the primidone that we had previously tried.    6/20/22  Patient returns today.  Is taking the metoprolol.  No lightheadedness or side effects.  He seen his primary care doctor and his allopurinol has been cut down.  Is taking primidone 1 tablet in the morning 1 in the evening.  Reports no side effects with this dose.  Find some benefit with the tremor but not enough to completely.  Wants to increase the dose.  Wants to try higher dose in the evening and the low-dose in the morning.  Does continue to have some symptoms of parkinsonism and wants to hold off on the Sinemet.  No balance issues.  No diplopia or other symptoms.  Reports no other new  "problems.    Previous history is reviewed and this is unchanged.    PAST MEDICAL/SURGICAL HISTORY  Past Medical History:   Diagnosis Date     Adenomatous polyp 03/1995     Anxiety      ED (erectile dysfunction)      Herpes simplex      HTN (hypertension)      Hyperlipidemia LDL goal < 130 05/2004     Renal stones 08/1994     Tremors 1980     Patient Active Problem List   Diagnosis     Hyperlipidemia with target LDL less than 130     HTN (hypertension)     Renal stone     Cataract   Significant tremors    FAMILY HISTORY  Family History   Problem Relation Age of Onset     Hypertension Mother         d. 80     Parkinsonism Mother      Asthma Son      C.A.D. Father         MI d. 68     Obesity Daughter    Family history suggestive of heart attack, tremors    SOCIAL HISTORY  Social History     Tobacco Use     Smoking status: Never Smoker     Smokeless tobacco: Never Used   Substance Use Topics     Alcohol use: Yes     Comment: on occasion     Drug use: No       SYSTEMS REVIEW  Twelve-system ROS was done and other than the HPI this was negative except for tremors  No new concerns/issues reported today.    MEDICATIONS  allopurinol (ZYLOPRIM) 300 MG tablet, Take 150 mg by mouth  aspirin (ASA) 81 MG chewable tablet, Take 81 mg by mouth  metoprolol tartrate (LOPRESSOR) 25 MG tablet, Take 1 tablet (25 mg) by mouth 2 times daily  Multiple Vitamin (MULTIVITAMIN PO), Take  by mouth.  potassium citrate (UROCIT-K) 10 MEQ (1080 MG) SR tablet, Take 1 tablet by mouth daily. Please schedule an appointment for future refills  pravastatin (PRAVACHOL) 40 MG tablet, Take 1 tablet by mouth At Bedtime.  tamsulosin (FLOMAX) 0.4 MG capsule, Take 0.4 mg by mouth    No current facility-administered medications on file prior to visit.       PHYSICAL EXAMINATION  VITALS: BP (!) 142/80   Pulse 60   Resp 16   Ht 1.791 m (5' 10.5\")   Wt 83 kg (183 lb)   BMI 25.89 kg/m    GENERAL: Healthy appearing, alert, no acute distress, normal " habitus.  CARDIOVASCULAR: Extremities warm and well perfused. Pulses present.   NEUROLOGICAL:  Patient is awake and oriented to self, place and time.  Attention span is normal.  Memory is grossly intact.  Language is fluent and follows commands appropriately.  Appropriate fund of knowledge. Cranial nerves 2-12 are intact. There is no pronator drift.  Motor exam shows 5/5 strength in all extremities.  Tone is symmetric bilaterally in upper and lower extremities.  (Previously reflexes are symmetric and 2+ brisk in upper extremities and lower extremities. Sensory exam is grossly intact to light touch, pin prick and vibration with decreased vibration in the right leg at the ankle.) Finger to nose and heel to shin is without dysmetria.  Romberg is negative.  Gait is normal with slight decreased arm swing in the right upper extremity and the patient is able to do tandem walk and walk on toes and heels.  On drawing concentric circles he does have significant tremor in both upper extremities.  Postural tremor is seen on exam as well.    Mild right resting tremor is noted.  Exam unchanged compared to before.  Continues to have the resting tremor as well as postural tremor.      DIAGNOSTICS  RELEVANT LABS  CMP and CBC non-contributory    OUTSIDE RECORDS  Outside referral notes and chart notes were reviewed and pertinent information has been summarized (in addition to the HPI):-Seen in primary care.  Goes to to [place]  every year.  He has had the Covid shot.  Had recently had some blood testing done.    LABS  Component      Latest Ref Rng & Units 6/10/2021           2:58 PM   Sodium      136 - 145 mmol/L 139   Potassium      3.5 - 5.0 mmol/L 3.9   Chloride      98 - 107 mmol/L 105   CO2      22 - 31 mmol/L 25   Anion Gap, Calculation      5 - 18 mmol/L 9   Glucose      70 - 125 mg/dL 89   BUN      8 - 28 mg/dL 21   Creatinine      0.70 - 1.30 mg/dL 1.09   GFR MDRD Af Amer      >60 mL/min/1.73m2 >60   GFR MDRD Non Af Amer       >60 mL/min/1.73m2 >60   Bilirubin, Total      0.0 - 1.0 mg/dL 0.7   Calcium      8.5 - 10.5 mg/dL 9.5   Protein, Total      6.0 - 8.0 g/dL 7.2   ALBUMIN      3.5 - 5.0 g/dL 4.2   Alkaline Phosphatase      45 - 120 U/L 75   AST      0 - 40 U/L 24   ALT      0 - 45 U/L 16   Albumin %      51.0 - 67.0 %    Albumin       3.2 - 4.7 g/dL    Alpha 1 %      2.0 - 4.0 %    Alpha 1      0.1 - 0.3 g/dL    Alpha 2 %      5.0 - 13.0 %    Alpha 2      0.4 - 0.9 g/dL    % Beta      10.0 - 17.0 %    Beta      0.7 - 1.2 g/dL    Gamma Globulin %      9.0 - 20.0 %    Gamma Globulin      0.6 - 1.4 g/dL    ELP Comment          Path ICD:          Interpreted By:          Immunofixation Electrophoresis, Serum          TSH      0.30 - 5.00 uIU/mL 2.42   Vitamin B-12      213 - 816 pg/mL 555   Vitamin B1, Whole Blood      70 - 180 nmol/L 186 (H)   Copper, Serum/Plasma      70.0 - 140.0 ug/dL 107.8   Ceruloplasmin      20 - 60 mg/dL 23     Component      Latest Ref Rng & Units 6/10/2021           2:58 PM   Sodium      136 - 145 mmol/L    Potassium      3.5 - 5.0 mmol/L    Chloride      98 - 107 mmol/L    CO2      22 - 31 mmol/L    Anion Gap, Calculation      5 - 18 mmol/L    Glucose      70 - 125 mg/dL    BUN      8 - 28 mg/dL    Creatinine      0.70 - 1.30 mg/dL    GFR MDRD Af Amer      >60 mL/min/1.73m2    GFR MDRD Non Af Amer      >60 mL/min/1.73m2    Bilirubin, Total      0.0 - 1.0 mg/dL    Calcium      8.5 - 10.5 mg/dL    Protein, Total      6.0 - 8.0 g/dL 6.9   ALBUMIN      3.5 - 5.0 g/dL    Alkaline Phosphatase      45 - 120 U/L    AST      0 - 40 U/L    ALT      0 - 45 U/L    Albumin %      51.0 - 67.0 % 65.8   Albumin       3.2 - 4.7 g/dL 4.5   Alpha 1 %      2.0 - 4.0 % 2.2   Alpha 1      0.1 - 0.3 g/dL 0.2   Alpha 2 %      5.0 - 13.0 % 9.1   Alpha 2      0.4 - 0.9 g/dL 0.6   % Beta      10.0 - 17.0 % 9.5 (L)   Beta      0.7 - 1.2 g/dL 0.7   Gamma Globulin %      9.0 - 20.0 % 13.4   Gamma Globulin      0.6 - 1.4 g/dL 0.9   ELP  Comment       Unremarkable protein electrophoresis.   Path ICD:       G25.0   Interpreted By:       Zana Maddox MD   Immunofixation Electrophoresis, Serum          TSH      0.30 - 5.00 uIU/mL    Vitamin B-12      213 - 816 pg/mL    Vitamin B1, Whole Blood      70 - 180 nmol/L    Copper, Serum/Plasma      70.0 - 140.0 ug/dL    Ceruloplasmin      20 - 60 mg/dL      Component      Latest Ref Rng & Units 6/10/2021           2:58 PM   Sodium      136 - 145 mmol/L    Potassium      3.5 - 5.0 mmol/L    Chloride      98 - 107 mmol/L    CO2      22 - 31 mmol/L    Anion Gap, Calculation      5 - 18 mmol/L    Glucose      70 - 125 mg/dL    BUN      8 - 28 mg/dL    Creatinine      0.70 - 1.30 mg/dL    GFR MDRD Af Amer      >60 mL/min/1.73m2    GFR MDRD Non Af Amer      >60 mL/min/1.73m2    Bilirubin, Total      0.0 - 1.0 mg/dL    Calcium      8.5 - 10.5 mg/dL    Protein, Total      6.0 - 8.0 g/dL    ALBUMIN      3.5 - 5.0 g/dL    Alkaline Phosphatase      45 - 120 U/L    AST      0 - 40 U/L    ALT      0 - 45 U/L    Albumin %      51.0 - 67.0 %    Albumin       3.2 - 4.7 g/dL    Alpha 1 %      2.0 - 4.0 %    Alpha 1      0.1 - 0.3 g/dL    Alpha 2 %      5.0 - 13.0 %    Alpha 2      0.4 - 0.9 g/dL    % Beta      10.0 - 17.0 %    Beta      0.7 - 1.2 g/dL    Gamma Globulin %      9.0 - 20.0 %    Gamma Globulin      0.6 - 1.4 g/dL    ELP Comment          Path ICD:       G25.0   Interpreted By:       Zana Maddox MD   Immunofixation Electrophoresis, Serum       Unremarkable immunofixation electrophoresis.   TSH      0.30 - 5.00 uIU/mL    Vitamin B-12      213 - 816 pg/mL    Vitamin B1, Whole Blood      70 - 180 nmol/L    Copper, Serum/Plasma      70.0 - 140.0 ug/dL    Ceruloplasmin      20 - 60 mg/dL    MG panel negative    IMPRESSION/REPORT/PLAN  Diplopia  Essential tremor  Hyperreflexia   Decreased vibratory sense  History of kidney stones  Coronary artery disease  Parkinsonism  Question of anxiety making tremors  worse    This is a 82 year old male tremor suggestive of essential tremor.  Blood work is negative for cause of tremors.  Primidone caused side effects of lightheadedness.  Topamax cannot be used because of history of kidney stones.  He is currently on metoprolol which is stopped.  This is used for coronary artery disease.  He was on 25 mg twice a day of regular metoprolol.  With switching him to propanolol he reports some somnolence with no improvement in the tremor.      He is now back on the metoprolol though does complain of lightheadedness.  Wants to try the primidone 1 more time to see if that would help.  Tremors are really bothersome to him.  Primidone 1 tablet in the morning and 1 tablet in the evening has been somewhat helpful and he wants to increase the medication.  We will increase the primidone to 2 tablets at nighttime and 1 tablet in the morning.  This should help with his somnolence.  Discussed about trying gabapentin as well.      On exam he might have a mild resting tremor in the right upper extremity with some decreased arm swing of the right side.  Discussed about possibility of parkinsonism.  Could consider Parkinson's medication to see if that further helps his symptoms.  Discussed with him again today    Him reporting that the tremors are related to anxiety-could consider an anxiety medication to see how much he improves    Patient does have some intermittent episodes of diplopia.  This could be related to myasthenia gravis since they are more prominent when he is more tired.  Myasthenia gravis panel was negative.  We will continue to monitor.  This is stable today.  Could consider an MRI of the brain.  Stable.    On examination patient has decreased vibratory sense in the right foot along with hyperreflexia.  Is possible he has cervical spinal stenosis versus other lesions.  It is unclear if this is related to the tremor or not.  Most likely this is not related to the tremors.  Could check a  MRI of the cervical spine down the road we will hold off for right now per patient request.  Stable.    I can see him back in 2 months.    -     metoprolol tartrate (LOPRESSOR) 25 MG tablet; Take 1 tablet (25 mg) by mouth 2 times daily  -     primidone (MYSOLINE) 50 MG tablet; 1 tab in AM and 2 tabs in PM      Return in about 2 months (around 8/20/2022) for In-Clinic Visit (must), or next available.    Over 32 minutes were spent coordinating the care for the patient on the day of the encounter.  This includes previsit, during visit and post visit activities as documented above.    Refractory problem with prescription management.  Counseling patient.  Multiple problems addressed.  (Activities include but not inclusive of reviewing chart, reviewing outside records, reviewing labs and imaging study results as well as the images, patient visit time including getting history and exam,  use if applicable, review of test results with the patient and coming up with a plan in a shared model, counseling patient and family, education and answering patient questions, EMR , EMR diagnosis entry and problem list management, medication reconciliation and prescription management if applicable, paperwork if applicable, printing documents and documentation of the visit activities.)    Gabe Rosa MD  Neurologist  [facility]  Tel:- [phone]    This note was dictated using voice recognition software.  Any grammatical or context distortions are unintentional and inherent to the software.

## 2022-06-20 NOTE — LETTER
6/20/2022         RE: Willis Soni  [address]  [facility] [address]        Dear Colleague,    Thank you for referring your patient, Willis Soni, to the [facility] [place]. Please see a copy of my visit note below.    NEUROLOGY OUTPATIENT PROGRESS NOTE   Jun 20, 2022     CHIEF COMPLAINT/REASON FOR VISIT/REASON FOR CONSULT  Patient presents with:  Follow Up    REASON FOR CONSULTATION- Tremors    HISTORY OF PRESENT ILLNESS  Willis Soni is a 82 year old male seen today for evaluation of tremors.  He reports that he has had tremors for the last 20 years.  They have progressively gotten worse.  He does have family history of tremors including his brother sister and mother.  Tremors are mainly prominent when he is trying to eat.  Tremors are not bothersome at rest.  Reports no significant parkinsonian symptoms.  No memory problems.  No difficulty keeping up with other people.  Denies any balance issues.  Denies any weakness in the arms.  Tremors are present in both upper extremities.  Has not tried any medications for this.    He does complain of episodes of double vision.  These are brought on by a staring on the television louder when he strongly uses hands and eyes to complete a jigsaw puzzle.  Has had cataract surgery in the past.  Closing one eye does improve the double vision.    7/14/21  Patient returns today.  He did try the primidone and try to cut down on the dose but continued to have lightheadedness.  He is currently off the medication.  Tremors are about the same.  Reports that the tremors only the revised to something.  Tremors have been there for several years.  He is on metoprolol for his heart.  The dosage recorded in the computer was incorrect and is taking 25 mg twice a day.  Took some time to clarify what dose he was taking.  Topamax was also discussed with him though he does have a history of kidney stones and initially prescribed though later was  discontinued.    Patient does have some hyperreflexia on examination with some spells of double vision.  We talked about looking into this further and he wants to hold off on that for right now since this is unrelated to his tremors.  He will be traveling to [place] in September.  Will be back in April.    9/1/21  Patient returns today.  He was put on propanolol previously instead of the metoprolol that he was taking for his coronary artery disease.  The propanolol he could only tolerate 20 mg twice a day.  Reports that it did not really help the tremor.  He continues to have tremor intermittently.  Reports that when he is anxious the tremors are worse.  Continues to have tremors when he is trying to eat or write.  Reports a lot of somnolence with the medication though the wife feels that he was already somnolent even before the medication.  After long discussion was decided to go back to the medication he was taking.    Continues to have double vision.  No significant issues with balance.  Is traveling to [place] and will be back in April.  Wants to go back to the previous metoprolol that he was taking.  Might want to see a neurologist at [facility] when he is down there.    5/9/22  Patient returns today.  Complains of ongoing lightheadedness.  Is only taking the metoprolol at this point.  Cannot tell me if the medication is making him lightheaded or not.  Feels that the tremors are really bad.  Mainly there with activity.  No balance issues.  Is still a bit slow to walk but nothing major.  No other new concerns.  Wants to retry the primidone that we had previously tried.    6/20/22  Patient returns today.  Is taking the metoprolol.  No lightheadedness or side effects.  He seen his primary care doctor and his allopurinol has been cut down.  Is taking primidone 1 tablet in the morning 1 in the evening.  Reports no side effects with this dose.  Find some benefit with the tremor but not enough to completely.   Wants to increase the dose.  Wants to try higher dose in the evening and the low-dose in the morning.  Does continue to have some symptoms of parkinsonism and wants to hold off on the Sinemet.  No balance issues.  No diplopia or other symptoms.  Reports no other new problems.    Previous history is reviewed and this is unchanged.    PAST MEDICAL/SURGICAL HISTORY  Past Medical History:   Diagnosis Date     Adenomatous polyp 03/1995     Anxiety      ED (erectile dysfunction)      Herpes simplex      HTN (hypertension)      Hyperlipidemia LDL goal < 130 05/2004     Renal stones 08/1994     Tremors 1980     Patient Active Problem List   Diagnosis     Hyperlipidemia with target LDL less than 130     HTN (hypertension)     Renal stone     Cataract   Significant tremors    FAMILY HISTORY  Family History   Problem Relation Age of Onset     Hypertension Mother         d. 80     Parkinsonism Mother      Asthma Son      C.A.D. Father         MI d. 68     Obesity Daughter    Family history suggestive of heart attack, tremors    SOCIAL HISTORY  Social History     Tobacco Use     Smoking status: Never Smoker     Smokeless tobacco: Never Used   Substance Use Topics     Alcohol use: Yes     Comment: on occasion     Drug use: No       SYSTEMS REVIEW  Twelve-system ROS was done and other than the HPI this was negative except for tremors  No new concerns/issues reported today.    MEDICATIONS  allopurinol (ZYLOPRIM) 300 MG tablet, Take 150 mg by mouth  aspirin (ASA) 81 MG chewable tablet, Take 81 mg by mouth  metoprolol tartrate (LOPRESSOR) 25 MG tablet, Take 1 tablet (25 mg) by mouth 2 times daily  Multiple Vitamin (MULTIVITAMIN PO), Take  by mouth.  potassium citrate (UROCIT-K) 10 MEQ (1080 MG) SR tablet, Take 1 tablet by mouth daily. Please schedule an appointment for future refills  pravastatin (PRAVACHOL) 40 MG tablet, Take 1 tablet by mouth At Bedtime.  tamsulosin (FLOMAX) 0.4 MG capsule, Take 0.4 mg by mouth    No current  "facility-administered medications on file prior to visit.       PHYSICAL EXAMINATION  VITALS: BP (!) 142/80   Pulse 60   Resp 16   Ht 1.791 m (5' 10.5\")   Wt 83 kg (183 lb)   BMI 25.89 kg/m    GENERAL: Healthy appearing, alert, no acute distress, normal habitus.  CARDIOVASCULAR: Extremities warm and well perfused. Pulses present.   NEUROLOGICAL:  Patient is awake and oriented to self, place and time.  Attention span is normal.  Memory is grossly intact.  Language is fluent and follows commands appropriately.  Appropriate fund of knowledge. Cranial nerves 2-12 are intact. There is no pronator drift.  Motor exam shows 5/5 strength in all extremities.  Tone is symmetric bilaterally in upper and lower extremities.  (Previously reflexes are symmetric and 2+ brisk in upper extremities and lower extremities. Sensory exam is grossly intact to light touch, pin prick and vibration with decreased vibration in the right leg at the ankle.) Finger to nose and heel to shin is without dysmetria.  Romberg is negative.  Gait is normal with slight decreased arm swing in the right upper extremity and the patient is able to do tandem walk and walk on toes and heels.  On drawing concentric circles he does have significant tremor in both upper extremities.  Postural tremor is seen on exam as well.    Mild right resting tremor is noted.  Exam unchanged compared to before.  Continues to have the resting tremor as well as postural tremor.      DIAGNOSTICS  RELEVANT LABS  CMP and CBC non-contributory    OUTSIDE RECORDS  Outside referral notes and chart notes were reviewed and pertinent information has been summarized (in addition to the HPI):-Seen in primary care.  Goes to to [place]  every year.  He has had the Covid shot.  Had recently had some blood testing done.    LABS  Component      Latest Ref Rng & Units 6/10/2021           2:58 PM   Sodium      136 - 145 mmol/L 139   Potassium      3.5 - 5.0 mmol/L 3.9   Chloride      98 - 107 " mmol/L 105   CO2      22 - 31 mmol/L 25   Anion Gap, Calculation      5 - 18 mmol/L 9   Glucose      70 - 125 mg/dL 89   BUN      8 - 28 mg/dL 21   Creatinine      0.70 - 1.30 mg/dL 1.09   GFR MDRD Af Amer      >60 mL/min/1.73m2 >60   GFR MDRD Non Af Amer      >60 mL/min/1.73m2 >60   Bilirubin, Total      0.0 - 1.0 mg/dL 0.7   Calcium      8.5 - 10.5 mg/dL 9.5   Protein, Total      6.0 - 8.0 g/dL 7.2   ALBUMIN      3.5 - 5.0 g/dL 4.2   Alkaline Phosphatase      45 - 120 U/L 75   AST      0 - 40 U/L 24   ALT      0 - 45 U/L 16   Albumin %      51.0 - 67.0 %    Albumin       3.2 - 4.7 g/dL    Alpha 1 %      2.0 - 4.0 %    Alpha 1      0.1 - 0.3 g/dL    Alpha 2 %      5.0 - 13.0 %    Alpha 2      0.4 - 0.9 g/dL    % Beta      10.0 - 17.0 %    Beta      0.7 - 1.2 g/dL    Gamma Globulin %      9.0 - 20.0 %    Gamma Globulin      0.6 - 1.4 g/dL    ELP Comment          Path ICD:          Interpreted By:          Immunofixation Electrophoresis, Serum          TSH      0.30 - 5.00 uIU/mL 2.42   Vitamin B-12      213 - 816 pg/mL 555   Vitamin B1, Whole Blood      70 - 180 nmol/L 186 (H)   Copper, Serum/Plasma      70.0 - 140.0 ug/dL 107.8   Ceruloplasmin      20 - 60 mg/dL 23     Component      Latest Ref Rng & Units 6/10/2021           2:58 PM   Sodium      136 - 145 mmol/L    Potassium      3.5 - 5.0 mmol/L    Chloride      98 - 107 mmol/L    CO2      22 - 31 mmol/L    Anion Gap, Calculation      5 - 18 mmol/L    Glucose      70 - 125 mg/dL    BUN      8 - 28 mg/dL    Creatinine      0.70 - 1.30 mg/dL    GFR MDRD Af Amer      >60 mL/min/1.73m2    GFR MDRD Non Af Amer      >60 mL/min/1.73m2    Bilirubin, Total      0.0 - 1.0 mg/dL    Calcium      8.5 - 10.5 mg/dL    Protein, Total      6.0 - 8.0 g/dL 6.9   ALBUMIN      3.5 - 5.0 g/dL    Alkaline Phosphatase      45 - 120 U/L    AST      0 - 40 U/L    ALT      0 - 45 U/L    Albumin %      51.0 - 67.0 % 65.8   Albumin       3.2 - 4.7 g/dL 4.5   Alpha 1 %      2.0 - 4.0 % 2.2    Alpha 1      0.1 - 0.3 g/dL 0.2   Alpha 2 %      5.0 - 13.0 % 9.1   Alpha 2      0.4 - 0.9 g/dL 0.6   % Beta      10.0 - 17.0 % 9.5 (L)   Beta      0.7 - 1.2 g/dL 0.7   Gamma Globulin %      9.0 - 20.0 % 13.4   Gamma Globulin      0.6 - 1.4 g/dL 0.9   ELP Comment       Unremarkable protein electrophoresis.   Path ICD:       G25.0   Interpreted By:       Zana Maddox MD   Immunofixation Electrophoresis, Serum          TSH      0.30 - 5.00 uIU/mL    Vitamin B-12      213 - 816 pg/mL    Vitamin B1, Whole Blood      70 - 180 nmol/L    Copper, Serum/Plasma      70.0 - 140.0 ug/dL    Ceruloplasmin      20 - 60 mg/dL      Component      Latest Ref Rng & Units 6/10/2021           2:58 PM   Sodium      136 - 145 mmol/L    Potassium      3.5 - 5.0 mmol/L    Chloride      98 - 107 mmol/L    CO2      22 - 31 mmol/L    Anion Gap, Calculation      5 - 18 mmol/L    Glucose      70 - 125 mg/dL    BUN      8 - 28 mg/dL    Creatinine      0.70 - 1.30 mg/dL    GFR MDRD Af Amer      >60 mL/min/1.73m2    GFR MDRD Non Af Amer      >60 mL/min/1.73m2    Bilirubin, Total      0.0 - 1.0 mg/dL    Calcium      8.5 - 10.5 mg/dL    Protein, Total      6.0 - 8.0 g/dL    ALBUMIN      3.5 - 5.0 g/dL    Alkaline Phosphatase      45 - 120 U/L    AST      0 - 40 U/L    ALT      0 - 45 U/L    Albumin %      51.0 - 67.0 %    Albumin       3.2 - 4.7 g/dL    Alpha 1 %      2.0 - 4.0 %    Alpha 1      0.1 - 0.3 g/dL    Alpha 2 %      5.0 - 13.0 %    Alpha 2      0.4 - 0.9 g/dL    % Beta      10.0 - 17.0 %    Beta      0.7 - 1.2 g/dL    Gamma Globulin %      9.0 - 20.0 %    Gamma Globulin      0.6 - 1.4 g/dL    ELP Comment          Path ICD:       G25.0   Interpreted By:       Zana Maddox MD   Immunofixation Electrophoresis, Serum       Unremarkable immunofixation electrophoresis.   TSH      0.30 - 5.00 uIU/mL    Vitamin B-12      213 - 816 pg/mL    Vitamin B1, Whole Blood      70 - 180 nmol/L    Copper, Serum/Plasma      70.0 - 140.0 ug/dL     Ceruloplasmin      20 - 60 mg/dL    MG panel negative    IMPRESSION/REPORT/PLAN  Diplopia  Essential tremor  Hyperreflexia   Decreased vibratory sense  History of kidney stones  Coronary artery disease  Parkinsonism  Question of anxiety making tremors worse    This is a 82 year old male tremor suggestive of essential tremor.  Blood work is negative for cause of tremors.  Primidone caused side effects of lightheadedness.  Topamax cannot be used because of history of kidney stones.  He is currently on metoprolol which is stopped.  This is used for coronary artery disease.  He was on 25 mg twice a day of regular metoprolol.  With switching him to propanolol he reports some somnolence with no improvement in the tremor.      He is now back on the metoprolol though does complain of lightheadedness.  Wants to try the primidone 1 more time to see if that would help.  Tremors are really bothersome to him.  Primidone 1 tablet in the morning and 1 tablet in the evening has been somewhat helpful and he wants to increase the medication.  We will increase the primidone to 2 tablets at nighttime and 1 tablet in the morning.  This should help with his somnolence.  Discussed about trying gabapentin as well.      On exam he might have a mild resting tremor in the right upper extremity with some decreased arm swing of the right side.  Discussed about possibility of parkinsonism.  Could consider Parkinson's medication to see if that further helps his symptoms.  Discussed with him again today    Him reporting that the tremors are related to anxiety-could consider an anxiety medication to see how much he improves    Patient does have some intermittent episodes of diplopia.  This could be related to myasthenia gravis since they are more prominent when he is more tired.  Myasthenia gravis panel was negative.  We will continue to monitor.  This is stable today.  Could consider an MRI of the brain.  Stable.    On examination patient has  decreased vibratory sense in the right foot along with hyperreflexia.  Is possible he has cervical spinal stenosis versus other lesions.  It is unclear if this is related to the tremor or not.  Most likely this is not related to the tremors.  Could check a MRI of the cervical spine down the road we will hold off for right now per patient request.  Stable.    I can see him back in 2 months.    -     metoprolol tartrate (LOPRESSOR) 25 MG tablet; Take 1 tablet (25 mg) by mouth 2 times daily  -     primidone (MYSOLINE) 50 MG tablet; 1 tab in AM and 2 tabs in PM      Return in about 2 months (around 8/20/2022) for In-Clinic Visit (must), or next available.    Over 32 minutes were spent coordinating the care for the patient on the day of the encounter.  This includes previsit, during visit and post visit activities as documented above.    Refractory problem with prescription management.  Counseling patient.  Multiple problems addressed.  (Activities include but not inclusive of reviewing chart, reviewing outside records, reviewing labs and imaging study results as well as the images, patient visit time including getting history and exam,  use if applicable, review of test results with the patient and coming up with a plan in a shared model, counseling patient and family, education and answering patient questions, EMR , EMR diagnosis entry and problem list management, medication reconciliation and prescription management if applicable, paperwork if applicable, printing documents and documentation of the visit activities.)    Gabe Rosa MD  Neurologist  [facility]  Tel:- [phone]    This note was dictated using voice recognition software.  Any grammatical or context distortions are unintentional and inherent to the software.        Again, thank you for allowing me to participate in the care of your patient.        Sincerely,        Gabe Rosa MD

## 2022-06-20 NOTE — NURSING NOTE
"Willis Soni is a 82 year old adult who presents for:  Chief Complaint   Patient presents with     Follow Up        Initial Vitals:  BP (!) 142/80   Pulse 60   Resp 16   Ht 1.791 m (5' 10.5\")   Wt 83 kg (183 lb)   BMI 25.89 kg/m   Estimated body mass index is 25.89 kg/m  as calculated from the following:    Height as of this encounter: 1.791 m (5' 10.5\").    Weight as of this encounter: 83 kg (183 lb).. Body surface area is 2.03 meters squared. BP completed using cuff size: wrist cuff  Data Unavailable    Nursing Comments:     Delisa Killian, [facility]    "

## 2022-07-19 ENCOUNTER — TELEPHONE (OUTPATIENT)
Dept: NEUROLOGY | Facility: CLINIC | Age: 82
End: 2022-07-19

## 2022-07-19 NOTE — TELEPHONE ENCOUNTER
See message below and advise.  Next appt with Dr. Rosa on 8/30/22.    Mariela Bran LPN on 7/19/2022 at 2:44 PM

## 2022-07-19 NOTE — TELEPHONE ENCOUNTER
[facility] Call Center    Phone Message    May a detailed message be left on voicemail: yes     Reason for Call: Medication Question or concern regarding medication   Prescription Clarification  Name of Medication: primidone (MYSOLINE) 50 MG tablet  Prescribing Provider: Dr. Rosa   What on the order needs clarification? Pt called and stated that he has been taking this medication for a couple of months now and he wants to know if he can start slowly getting off the medication. Pt stated that he is getting so lightheaded and dizzy that he can barely walk anymore and it is not helping his condition at all. Please call Pt back to discuss ASAP.           Action Taken: Message routed to:  [facility]    Travel Screening: Not Applicable

## 2022-07-20 NOTE — TELEPHONE ENCOUNTER
He can reduce the primidone down to 1 tablet in the morning 1 in the evening.  He could further reduce it or stop it if he still has lightheadedness from the medication.

## 2022-07-20 NOTE — TELEPHONE ENCOUNTER
Spoke to pt, relayed Dr. Rosa's message below.  Pt verbalizes understanding. He will try to decrease the amount of primidone and see if there is any improvement. If no improvement he states he will most likely stop it altogether.      Mariela Bran LPN on 7/20/2022 at 10:03 AM

## 2022-08-09 DIAGNOSIS — G25.0 ESSENTIAL TREMOR: ICD-10-CM

## 2022-08-09 RX ORDER — PRIMIDONE 50 MG/1
TABLET ORAL
Qty: 180 TABLET | OUTPATIENT
Start: 2022-08-09

## 2022-08-09 RX ORDER — PRIMIDONE 50 MG/1
TABLET ORAL
Qty: 60 TABLET | Refills: 0 | Status: SHIPPED | OUTPATIENT
Start: 2022-08-09 | End: 2022-08-30

## 2022-08-09 NOTE — TELEPHONE ENCOUNTER
Refill request for primidone.  Pt has upcoming appt on 8/30/22.  Medication T'd for review and signature    Mariela Bran LPN on 8/9/2022 at 11:46 AM

## 2022-08-30 ENCOUNTER — OFFICE VISIT (OUTPATIENT)
Dept: NEUROLOGY | Facility: CLINIC | Age: 82
End: 2022-08-30
Payer: MEDICARE

## 2022-08-30 VITALS
WEIGHT: 180 LBS | RESPIRATION RATE: 18 BRPM | HEART RATE: 60 BPM | DIASTOLIC BLOOD PRESSURE: 60 MMHG | BODY MASS INDEX: 25.46 KG/M2 | SYSTOLIC BLOOD PRESSURE: 114 MMHG

## 2022-08-30 DIAGNOSIS — H53.2 DIPLOPIA: ICD-10-CM

## 2022-08-30 DIAGNOSIS — R20.8 DECREASED VIBRATORY SENSE: ICD-10-CM

## 2022-08-30 DIAGNOSIS — R29.2 HYPERREFLEXIA: ICD-10-CM

## 2022-08-30 DIAGNOSIS — G25.2 RESTING TREMOR: ICD-10-CM

## 2022-08-30 DIAGNOSIS — G25.0 ESSENTIAL TREMOR: Primary | ICD-10-CM

## 2022-08-30 PROCEDURE — 99214 OFFICE O/P EST MOD 30 MIN: CPT | Performed by: PSYCHIATRY & NEUROLOGY

## 2022-08-30 NOTE — LETTER
8/30/2022         RE: Willis Soni  [address]  [facility] [address]        Dear Colleague,    Thank you for referring your patient, Willis Soni, to the [facility] [place]. Please see a copy of my visit note below.    NEUROLOGY OUTPATIENT PROGRESS NOTE   Aug 30, 2022     CHIEF COMPLAINT/REASON FOR VISIT/REASON FOR CONSULT  Patient presents with:  Follow Up    REASON FOR CONSULTATION- Tremors    HISTORY OF PRESENT ILLNESS  Willis Soni is a 82 year old male seen today for evaluation of tremors.  He reports that he has had tremors for the last 20 years.  They have progressively gotten worse.  He does have family history of tremors including his brother sister and mother.  Tremors are mainly prominent when he is trying to eat.  Tremors are not bothersome at rest.  Reports no significant parkinsonian symptoms.  No memory problems.  No difficulty keeping up with other people.  Denies any balance issues.  Denies any weakness in the arms.  Tremors are present in both upper extremities.  Has not tried any medications for this.    He does complain of episodes of double vision.  These are brought on by a staring on the television louder when he strongly uses hands and eyes to complete a jigsaw puzzle.  Has had cataract surgery in the past.  Closing one eye does improve the double vision.    7/14/21  Patient returns today.  He did try the primidone and try to cut down on the dose but continued to have lightheadedness.  He is currently off the medication.  Tremors are about the same.  Reports that the tremors only the revised to something.  Tremors have been there for several years.  He is on metoprolol for his heart.  The dosage recorded in the computer was incorrect and is taking 25 mg twice a day.  Took some time to clarify what dose he was taking.  Topamax was also discussed with him though he does have a history of kidney stones and initially prescribed though later was  discontinued.    Patient does have some hyperreflexia on examination with some spells of double vision.  We talked about looking into this further and he wants to hold off on that for right now since this is unrelated to his tremors.  He will be traveling to [place] in September.  Will be back in April.    9/1/21  Patient returns today.  He was put on propanolol previously instead of the metoprolol that he was taking for his coronary artery disease.  The propanolol he could only tolerate 20 mg twice a day.  Reports that it did not really help the tremor.  He continues to have tremor intermittently.  Reports that when he is anxious the tremors are worse.  Continues to have tremors when he is trying to eat or write.  Reports a lot of somnolence with the medication though the wife feels that he was already somnolent even before the medication.  After long discussion was decided to go back to the medication he was taking.    Continues to have double vision.  No significant issues with balance.  Is traveling to [place] and will be back in April.  Wants to go back to the previous metoprolol that he was taking.  Might want to see a neurologist at [facility] when he is down there.    5/9/22  Patient returns today.  Complains of ongoing lightheadedness.  Is only taking the metoprolol at this point.  Cannot tell me if the medication is making him lightheaded or not.  Feels that the tremors are really bad.  Mainly there with activity.  No balance issues.  Is still a bit slow to walk but nothing major.  No other new concerns.  Wants to retry the primidone that we had previously tried.    6/20/22  Patient returns today.  Is taking the metoprolol.  No lightheadedness or side effects.  He seen his primary care doctor and his allopurinol has been cut down.  Is taking primidone 1 tablet in the morning 1 in the evening.  Reports no side effects with this dose.  Find some benefit with the tremor but not enough to completely.   Wants to increase the dose.  Wants to try higher dose in the evening and the low-dose in the morning.  Does continue to have some symptoms of parkinsonism and wants to hold off on the Sinemet.  No balance issues.  No diplopia or other symptoms.  Reports no other new problems.    8/30/22  Patient returns today.  He was taking the primidone as previously discussed.  He did have side effects and cut it down to half a pill twice a day.  Has not noticed any benefit with the tremors.  Tremors remain unchanged.  Does not want to try any more medications.  Wants to get off all the medications and let things the way they were.  Feels that he might be anxious at times which might be making the tremors worse.  Does report some weakness in her arms when he is trying to walk.  Remains on the metoprolol.  Encouraged him to stay on the metoprolol since that might be more for the heart and for the tremors.  No other new issues.    Previous history is reviewed and this is unchanged.    PAST MEDICAL/SURGICAL HISTORY  Past Medical History:   Diagnosis Date     Adenomatous polyp 03/1995     Anxiety      ED (erectile dysfunction)      Herpes simplex      HTN (hypertension)      Hyperlipidemia LDL goal < 130 05/2004     Renal stones 08/1994     Tremors 1980     Patient Active Problem List   Diagnosis     Hyperlipidemia with target LDL less than 130     HTN (hypertension)     Renal stone     Cataract   Significant tremors    FAMILY HISTORY  Family History   Problem Relation Age of Onset     Hypertension Mother         d. 80     Parkinsonism Mother      Asthma Son      C.A.D. Father         MI d. 68     Obesity Daughter    Family history suggestive of heart attack, tremors    SOCIAL HISTORY  Social History     Tobacco Use     Smoking status: Never Smoker     Smokeless tobacco: Never Used   Substance Use Topics     Alcohol use: Yes     Comment: on occasion     Drug use: No       SYSTEMS REVIEW  Twelve-system ROS was done and other than the  HPI this was negative except for tremors  No other new concerns/issues reported today.    MEDICATIONS  allopurinol (ZYLOPRIM) 300 MG tablet, Take 150 mg by mouth  aspirin (ASA) 81 MG chewable tablet, Take 81 mg by mouth  metoprolol tartrate (LOPRESSOR) 25 MG tablet, Take 1 tablet (25 mg) by mouth 2 times daily  Multiple Vitamin (MULTIVITAMIN PO), Take  by mouth.  potassium citrate (UROCIT-K) 10 MEQ (1080 MG) SR tablet, Take 1 tablet by mouth daily. Please schedule an appointment for future refills  pravastatin (PRAVACHOL) 40 MG tablet, Take 1 tablet by mouth At Bedtime.  tamsulosin (FLOMAX) 0.4 MG capsule, Take 0.4 mg by mouth    No current facility-administered medications on file prior to visit.       PHYSICAL EXAMINATION  VITALS: /60   Pulse 60   Resp 18   Wt 81.6 kg (180 lb)   BMI 25.46 kg/m    GENERAL: Healthy appearing, alert, no acute distress, normal habitus.  CARDIOVASCULAR: Extremities warm and well perfused. Pulses present.   NEUROLOGICAL:  Patient is awake and oriented to self, place and time.  Attention span is normal.  Memory is grossly intact.  Language is fluent and follows commands appropriately.  Appropriate fund of knowledge. Cranial nerves 2-12 are intact. There is no pronator drift.  Motor exam shows 5/5 strength in all extremities.  Tone is symmetric bilaterally in upper and lower extremities.  Does seem to have a resting tremor in both upper extremities today.  (Previously reflexes are symmetric and 2+ brisk in upper extremities and lower extremities. Sensory exam is grossly intact to light touch, pin prick and vibration with decreased vibration in the right leg at the ankle.) Finger to nose and heel to shin is without dysmetria.  Romberg is negative.  Gait is normal with slight decreased arm swing in the right upper extremity, slight on the left and the patient is able to do tandem walk and walk on toes and heels.  On drawing concentric circles he does have significant tremor in  both upper extremities.  Postural tremor is seen on exam as well.    Bilateral mild resting tremor noted.  Exam similar to before.  Has resting tremor on both sides postural tremor as well.    DIAGNOSTICS  RELEVANT LABS  CMP and CBC non-contributory    OUTSIDE RECORDS  Outside referral notes and chart notes were reviewed and pertinent information has been summarized (in addition to the HPI):-Seen in primary care.  Goes to to [place]  every year.  He has had the Covid shot.  Had recently had some blood testing done.    LABS  Component      Latest Ref Rng & Units 6/10/2021           2:58 PM   Sodium      136 - 145 mmol/L 139   Potassium      3.5 - 5.0 mmol/L 3.9   Chloride      98 - 107 mmol/L 105   CO2      22 - 31 mmol/L 25   Anion Gap, Calculation      5 - 18 mmol/L 9   Glucose      70 - 125 mg/dL 89   BUN      8 - 28 mg/dL 21   Creatinine      0.70 - 1.30 mg/dL 1.09   GFR MDRD Af Amer      >60 mL/min/1.73m2 >60   GFR MDRD Non Af Amer      >60 mL/min/1.73m2 >60   Bilirubin, Total      0.0 - 1.0 mg/dL 0.7   Calcium      8.5 - 10.5 mg/dL 9.5   Protein, Total      6.0 - 8.0 g/dL 7.2   ALBUMIN      3.5 - 5.0 g/dL 4.2   Alkaline Phosphatase      45 - 120 U/L 75   AST      0 - 40 U/L 24   ALT      0 - 45 U/L 16   Albumin %      51.0 - 67.0 %    Albumin       3.2 - 4.7 g/dL    Alpha 1 %      2.0 - 4.0 %    Alpha 1      0.1 - 0.3 g/dL    Alpha 2 %      5.0 - 13.0 %    Alpha 2      0.4 - 0.9 g/dL    % Beta      10.0 - 17.0 %    Beta      0.7 - 1.2 g/dL    Gamma Globulin %      9.0 - 20.0 %    Gamma Globulin      0.6 - 1.4 g/dL    ELP Comment          Path ICD:          Interpreted By:          Immunofixation Electrophoresis, Serum          TSH      0.30 - 5.00 uIU/mL 2.42   Vitamin B-12      213 - 816 pg/mL 555   Vitamin B1, Whole Blood      70 - 180 nmol/L 186 (H)   Copper, Serum/Plasma      70.0 - 140.0 ug/dL 107.8   Ceruloplasmin      20 - 60 mg/dL 23     Component      Latest Ref Rng & Units 6/10/2021           2:58 PM    Sodium      136 - 145 mmol/L    Potassium      3.5 - 5.0 mmol/L    Chloride      98 - 107 mmol/L    CO2      22 - 31 mmol/L    Anion Gap, Calculation      5 - 18 mmol/L    Glucose      70 - 125 mg/dL    BUN      8 - 28 mg/dL    Creatinine      0.70 - 1.30 mg/dL    GFR MDRD Af Amer      >60 mL/min/1.73m2    GFR MDRD Non Af Amer      >60 mL/min/1.73m2    Bilirubin, Total      0.0 - 1.0 mg/dL    Calcium      8.5 - 10.5 mg/dL    Protein, Total      6.0 - 8.0 g/dL 6.9   ALBUMIN      3.5 - 5.0 g/dL    Alkaline Phosphatase      45 - 120 U/L    AST      0 - 40 U/L    ALT      0 - 45 U/L    Albumin %      51.0 - 67.0 % 65.8   Albumin       3.2 - 4.7 g/dL 4.5   Alpha 1 %      2.0 - 4.0 % 2.2   Alpha 1      0.1 - 0.3 g/dL 0.2   Alpha 2 %      5.0 - 13.0 % 9.1   Alpha 2      0.4 - 0.9 g/dL 0.6   % Beta      10.0 - 17.0 % 9.5 (L)   Beta      0.7 - 1.2 g/dL 0.7   Gamma Globulin %      9.0 - 20.0 % 13.4   Gamma Globulin      0.6 - 1.4 g/dL 0.9   ELP Comment       Unremarkable protein electrophoresis.   Path ICD:       G25.0   Interpreted By:       Zana Maddox MD   Immunofixation Electrophoresis, Serum          TSH      0.30 - 5.00 uIU/mL    Vitamin B-12      213 - 816 pg/mL    Vitamin B1, Whole Blood      70 - 180 nmol/L    Copper, Serum/Plasma      70.0 - 140.0 ug/dL    Ceruloplasmin      20 - 60 mg/dL      Component      Latest Ref Rng & Units 6/10/2021           2:58 PM   Sodium      136 - 145 mmol/L    Potassium      3.5 - 5.0 mmol/L    Chloride      98 - 107 mmol/L    CO2      22 - 31 mmol/L    Anion Gap, Calculation      5 - 18 mmol/L    Glucose      70 - 125 mg/dL    BUN      8 - 28 mg/dL    Creatinine      0.70 - 1.30 mg/dL    GFR MDRD Af Amer      >60 mL/min/1.73m2    GFR MDRD Non Af Amer      >60 mL/min/1.73m2    Bilirubin, Total      0.0 - 1.0 mg/dL    Calcium      8.5 - 10.5 mg/dL    Protein, Total      6.0 - 8.0 g/dL    ALBUMIN      3.5 - 5.0 g/dL    Alkaline Phosphatase      45 - 120 U/L    AST      0 - 40  U/L    ALT      0 - 45 U/L    Albumin %      51.0 - 67.0 %    Albumin       3.2 - 4.7 g/dL    Alpha 1 %      2.0 - 4.0 %    Alpha 1      0.1 - 0.3 g/dL    Alpha 2 %      5.0 - 13.0 %    Alpha 2      0.4 - 0.9 g/dL    % Beta      10.0 - 17.0 %    Beta      0.7 - 1.2 g/dL    Gamma Globulin %      9.0 - 20.0 %    Gamma Globulin      0.6 - 1.4 g/dL    ELP Comment          Path ICD:       G25.0   Interpreted By:       Zana Maddox MD   Immunofixation Electrophoresis, Serum       Unremarkable immunofixation electrophoresis.   TSH      0.30 - 5.00 uIU/mL    Vitamin B-12      213 - 816 pg/mL    Vitamin B1, Whole Blood      70 - 180 nmol/L    Copper, Serum/Plasma      70.0 - 140.0 ug/dL    Ceruloplasmin      20 - 60 mg/dL    MG panel negative    IMPRESSION/REPORT/PLAN  Diplopia  Essential tremor  Hyperreflexia   Decreased vibratory sense  History of kidney stones  Coronary artery disease  Parkinsonism/resting tremor with decreased arm swing  Question of anxiety making tremors worse    This is a 82 year old male tremor suggestive of essential tremor.  Blood work is negative for cause of tremors.  Primidone caused side effects of lightheadedness.  Topamax cannot be used because of history of kidney stones.  Propanolol was not helpful either.  Metoprolol is currently being used for heart issues and will continue.  We will get him off the primidone since he does no longer wants to be on it.  Gabapentin has been discussed that he does not want to try any more medications.    On exam he might have a mild resting tremor in the both upper extremity with slight decreased arm swing bilaterally. Discussed about possibility of parkinsonism.  Could consider Parkinson's medication to see if that further helps his symptoms.  Discussed trial of Sinemet to see if that would help and he does not want to try the right now.  Discussed other signs symptoms of Parkinson's disease.    Him reporting that the tremors are related to  anxiety-could consider an anxiety medication to see how much he improves    Patient does have some intermittent episodes of diplopia.  This could be related to myasthenia gravis since they are more prominent when he is more tired.  Myasthenia gravis panel was negative.  We will continue to monitor.  Stable.  Could consider MRI of the brain.  He thinks it is more related to puffiness of his eyes.  Will monitor.    Previously-on examination patient has decreased vibratory sense in the right foot along with hyperreflexia.  Is possible he has cervical spinal stenosis versus other lesions.  It is unclear if this is related to the tremor or not.  Most likely this is not related to the tremors.  Could check a MRI of the cervical spine down the road we will hold off for right now per patient request.  Stable.    I can see him back on as-needed basis if he wants to try more medication or do more testing.    -     metoprolol tartrate (LOPRESSOR) 25 MG tablet; Take 1 tablet (25 mg) by mouth 2 times daily  -     primidone (MYSOLINE) 50 MG tablet; 1 tab in AM and 2 tabs in PM-_STOP      Return if symptoms worsen or fail to improve, for In-Clinic Visit (must).    Over 30 minutes were spent coordinating the care for the patient on the day of the encounter.  This includes previsit, during visit and post visit activities as documented above.    Counseling patient.  Multiple problems reviewed/addressed.  Prescription management.  Medication side effect.  (Activities include but not inclusive of reviewing chart, reviewing outside records, reviewing labs and imaging study results as well as the images, patient visit time including getting history and exam,  use if applicable, review of test results with the patient and coming up with a plan in a shared model, counseling patient and family, education and answering patient questions, EMR , EMR diagnosis entry and problem list management, medication reconciliation  and prescription management if applicable, paperwork if applicable, printing documents and documentation of the visit activities.)    Gabe Rosa MD  Neurologist  [facility]  Tel:- [phone]    This note was dictated using voice recognition software.  Any grammatical or context distortions are unintentional and inherent to the software.        Again, thank you for allowing me to participate in the care of your patient.        Sincerely,        Gabe Rosa MD

## 2022-08-30 NOTE — PROGRESS NOTES
NEUROLOGY OUTPATIENT PROGRESS NOTE   Aug 30, 2022     CHIEF COMPLAINT/REASON FOR VISIT/REASON FOR CONSULT  Patient presents with:  Follow Up    REASON FOR CONSULTATION- Tremors    HISTORY OF PRESENT ILLNESS  Willis Soni is a 82 year old male seen today for evaluation of tremors.  He reports that he has had tremors for the last 20 years.  They have progressively gotten worse.  He does have family history of tremors including his brother sister and mother.  Tremors are mainly prominent when he is trying to eat.  Tremors are not bothersome at rest.  Reports no significant parkinsonian symptoms.  No memory problems.  No difficulty keeping up with other people.  Denies any balance issues.  Denies any weakness in the arms.  Tremors are present in both upper extremities.  Has not tried any medications for this.    He does complain of episodes of double vision.  These are brought on by a staring on the television louder when he strongly uses hands and eyes to complete a jigsaw puzzle.  Has had cataract surgery in the past.  Closing one eye does improve the double vision.    7/14/21  Patient returns today.  He did try the primidone and try to cut down on the dose but continued to have lightheadedness.  He is currently off the medication.  Tremors are about the same.  Reports that the tremors only the revised to something.  Tremors have been there for several years.  He is on metoprolol for his heart.  The dosage recorded in the computer was incorrect and is taking 25 mg twice a day.  Took some time to clarify what dose he was taking.  Topamax was also discussed with him though he does have a history of kidney stones and initially prescribed though later was discontinued.    Patient does have some hyperreflexia on examination with some spells of double vision.  We talked about looking into this further and he wants to hold off on that for right now since this is unrelated to his tremors.  He will be traveling to [place]  in September.  Will be back in April.    9/1/21  Patient returns today.  He was put on propanolol previously instead of the metoprolol that he was taking for his coronary artery disease.  The propanolol he could only tolerate 20 mg twice a day.  Reports that it did not really help the tremor.  He continues to have tremor intermittently.  Reports that when he is anxious the tremors are worse.  Continues to have tremors when he is trying to eat or write.  Reports a lot of somnolence with the medication though the wife feels that he was already somnolent even before the medication.  After long discussion was decided to go back to the medication he was taking.    Continues to have double vision.  No significant issues with balance.  Is traveling to [place] and will be back in April.  Wants to go back to the previous metoprolol that he was taking.  Might want to see a neurologist at [facility] when he is down there.    5/9/22  Patient returns today.  Complains of ongoing lightheadedness.  Is only taking the metoprolol at this point.  Cannot tell me if the medication is making him lightheaded or not.  Feels that the tremors are really bad.  Mainly there with activity.  No balance issues.  Is still a bit slow to walk but nothing major.  No other new concerns.  Wants to retry the primidone that we had previously tried.    6/20/22  Patient returns today.  Is taking the metoprolol.  No lightheadedness or side effects.  He seen his primary care doctor and his allopurinol has been cut down.  Is taking primidone 1 tablet in the morning 1 in the evening.  Reports no side effects with this dose.  Find some benefit with the tremor but not enough to completely.  Wants to increase the dose.  Wants to try higher dose in the evening and the low-dose in the morning.  Does continue to have some symptoms of parkinsonism and wants to hold off on the Sinemet.  No balance issues.  No diplopia or other symptoms.  Reports no other new  problems.    8/30/22  Patient returns today.  He was taking the primidone as previously discussed.  He did have side effects and cut it down to half a pill twice a day.  Has not noticed any benefit with the tremors.  Tremors remain unchanged.  Does not want to try any more medications.  Wants to get off all the medications and let things the way they were.  Feels that he might be anxious at times which might be making the tremors worse.  Does report some weakness in her arms when he is trying to walk.  Remains on the metoprolol.  Encouraged him to stay on the metoprolol since that might be more for the heart and for the tremors.  No other new issues.    Previous history is reviewed and this is unchanged.    PAST MEDICAL/SURGICAL HISTORY  Past Medical History:   Diagnosis Date     Adenomatous polyp 03/1995     Anxiety      ED (erectile dysfunction)      Herpes simplex      HTN (hypertension)      Hyperlipidemia LDL goal < 130 05/2004     Renal stones 08/1994     Tremors 1980     Patient Active Problem List   Diagnosis     Hyperlipidemia with target LDL less than 130     HTN (hypertension)     Renal stone     Cataract   Significant tremors    FAMILY HISTORY  Family History   Problem Relation Age of Onset     Hypertension Mother         d. 80     Parkinsonism Mother      Asthma Son      C.A.D. Father         MI d. 68     Obesity Daughter    Family history suggestive of heart attack, tremors    SOCIAL HISTORY  Social History     Tobacco Use     Smoking status: Never Smoker     Smokeless tobacco: Never Used   Substance Use Topics     Alcohol use: Yes     Comment: on occasion     Drug use: No       SYSTEMS REVIEW  Twelve-system ROS was done and other than the HPI this was negative except for tremors  No other new concerns/issues reported today.    MEDICATIONS  allopurinol (ZYLOPRIM) 300 MG tablet, Take 150 mg by mouth  aspirin (ASA) 81 MG chewable tablet, Take 81 mg by mouth  metoprolol tartrate (LOPRESSOR) 25 MG tablet,  Take 1 tablet (25 mg) by mouth 2 times daily  Multiple Vitamin (MULTIVITAMIN PO), Take  by mouth.  potassium citrate (UROCIT-K) 10 MEQ (1080 MG) SR tablet, Take 1 tablet by mouth daily. Please schedule an appointment for future refills  pravastatin (PRAVACHOL) 40 MG tablet, Take 1 tablet by mouth At Bedtime.  tamsulosin (FLOMAX) 0.4 MG capsule, Take 0.4 mg by mouth    No current facility-administered medications on file prior to visit.       PHYSICAL EXAMINATION  VITALS: /60   Pulse 60   Resp 18   Wt 81.6 kg (180 lb)   BMI 25.46 kg/m    GENERAL: Healthy appearing, alert, no acute distress, normal habitus.  CARDIOVASCULAR: Extremities warm and well perfused. Pulses present.   NEUROLOGICAL:  Patient is awake and oriented to self, place and time.  Attention span is normal.  Memory is grossly intact.  Language is fluent and follows commands appropriately.  Appropriate fund of knowledge. Cranial nerves 2-12 are intact. There is no pronator drift.  Motor exam shows 5/5 strength in all extremities.  Tone is symmetric bilaterally in upper and lower extremities.  Does seem to have a resting tremor in both upper extremities today.  (Previously reflexes are symmetric and 2+ brisk in upper extremities and lower extremities. Sensory exam is grossly intact to light touch, pin prick and vibration with decreased vibration in the right leg at the ankle.) Finger to nose and heel to shin is without dysmetria.  Romberg is negative.  Gait is normal with slight decreased arm swing in the right upper extremity, slight on the left and the patient is able to do tandem walk and walk on toes and heels.  On drawing concentric circles he does have significant tremor in both upper extremities.  Postural tremor is seen on exam as well.    Bilateral mild resting tremor noted.  Exam similar to before.  Has resting tremor on both sides postural tremor as well.    DIAGNOSTICS  RELEVANT LABS  CMP and CBC non-contributory    OUTSIDE  RECORDS  Outside referral notes and chart notes were reviewed and pertinent information has been summarized (in addition to the HPI):-Seen in primary care.  Goes to to [place]  every year.  He has had the Covid shot.  Had recently had some blood testing done.    LABS  Component      Latest Ref Rng & Units 6/10/2021           2:58 PM   Sodium      136 - 145 mmol/L 139   Potassium      3.5 - 5.0 mmol/L 3.9   Chloride      98 - 107 mmol/L 105   CO2      22 - 31 mmol/L 25   Anion Gap, Calculation      5 - 18 mmol/L 9   Glucose      70 - 125 mg/dL 89   BUN      8 - 28 mg/dL 21   Creatinine      0.70 - 1.30 mg/dL 1.09   GFR MDRD Af Amer      >60 mL/min/1.73m2 >60   GFR MDRD Non Af Amer      >60 mL/min/1.73m2 >60   Bilirubin, Total      0.0 - 1.0 mg/dL 0.7   Calcium      8.5 - 10.5 mg/dL 9.5   Protein, Total      6.0 - 8.0 g/dL 7.2   ALBUMIN      3.5 - 5.0 g/dL 4.2   Alkaline Phosphatase      45 - 120 U/L 75   AST      0 - 40 U/L 24   ALT      0 - 45 U/L 16   Albumin %      51.0 - 67.0 %    Albumin       3.2 - 4.7 g/dL    Alpha 1 %      2.0 - 4.0 %    Alpha 1      0.1 - 0.3 g/dL    Alpha 2 %      5.0 - 13.0 %    Alpha 2      0.4 - 0.9 g/dL    % Beta      10.0 - 17.0 %    Beta      0.7 - 1.2 g/dL    Gamma Globulin %      9.0 - 20.0 %    Gamma Globulin      0.6 - 1.4 g/dL    ELP Comment          Path ICD:          Interpreted By:          Immunofixation Electrophoresis, Serum          TSH      0.30 - 5.00 uIU/mL 2.42   Vitamin B-12      213 - 816 pg/mL 555   Vitamin B1, Whole Blood      70 - 180 nmol/L 186 (H)   Copper, Serum/Plasma      70.0 - 140.0 ug/dL 107.8   Ceruloplasmin      20 - 60 mg/dL 23     Component      Latest Ref Rng & Units 6/10/2021           2:58 PM   Sodium      136 - 145 mmol/L    Potassium      3.5 - 5.0 mmol/L    Chloride      98 - 107 mmol/L    CO2      22 - 31 mmol/L    Anion Gap, Calculation      5 - 18 mmol/L    Glucose      70 - 125 mg/dL    BUN      8 - 28 mg/dL    Creatinine      0.70 - 1.30  mg/dL    GFR MDRD Af Amer      >60 mL/min/1.73m2    GFR MDRD Non Af Amer      >60 mL/min/1.73m2    Bilirubin, Total      0.0 - 1.0 mg/dL    Calcium      8.5 - 10.5 mg/dL    Protein, Total      6.0 - 8.0 g/dL 6.9   ALBUMIN      3.5 - 5.0 g/dL    Alkaline Phosphatase      45 - 120 U/L    AST      0 - 40 U/L    ALT      0 - 45 U/L    Albumin %      51.0 - 67.0 % 65.8   Albumin       3.2 - 4.7 g/dL 4.5   Alpha 1 %      2.0 - 4.0 % 2.2   Alpha 1      0.1 - 0.3 g/dL 0.2   Alpha 2 %      5.0 - 13.0 % 9.1   Alpha 2      0.4 - 0.9 g/dL 0.6   % Beta      10.0 - 17.0 % 9.5 (L)   Beta      0.7 - 1.2 g/dL 0.7   Gamma Globulin %      9.0 - 20.0 % 13.4   Gamma Globulin      0.6 - 1.4 g/dL 0.9   ELP Comment       Unremarkable protein electrophoresis.   Path ICD:       G25.0   Interpreted By:       Zana Maddox MD   Immunofixation Electrophoresis, Serum          TSH      0.30 - 5.00 uIU/mL    Vitamin B-12      213 - 816 pg/mL    Vitamin B1, Whole Blood      70 - 180 nmol/L    Copper, Serum/Plasma      70.0 - 140.0 ug/dL    Ceruloplasmin      20 - 60 mg/dL      Component      Latest Ref Rng & Units 6/10/2021           2:58 PM   Sodium      136 - 145 mmol/L    Potassium      3.5 - 5.0 mmol/L    Chloride      98 - 107 mmol/L    CO2      22 - 31 mmol/L    Anion Gap, Calculation      5 - 18 mmol/L    Glucose      70 - 125 mg/dL    BUN      8 - 28 mg/dL    Creatinine      0.70 - 1.30 mg/dL    GFR MDRD Af Amer      >60 mL/min/1.73m2    GFR MDRD Non Af Amer      >60 mL/min/1.73m2    Bilirubin, Total      0.0 - 1.0 mg/dL    Calcium      8.5 - 10.5 mg/dL    Protein, Total      6.0 - 8.0 g/dL    ALBUMIN      3.5 - 5.0 g/dL    Alkaline Phosphatase      45 - 120 U/L    AST      0 - 40 U/L    ALT      0 - 45 U/L    Albumin %      51.0 - 67.0 %    Albumin       3.2 - 4.7 g/dL    Alpha 1 %      2.0 - 4.0 %    Alpha 1      0.1 - 0.3 g/dL    Alpha 2 %      5.0 - 13.0 %    Alpha 2      0.4 - 0.9 g/dL    % Beta      10.0 - 17.0 %    Beta       0.7 - 1.2 g/dL    Gamma Globulin %      9.0 - 20.0 %    Gamma Globulin      0.6 - 1.4 g/dL    ELP Comment          Path ICD:       G25.0   Interpreted By:       Zana Maddox MD   Immunofixation Electrophoresis, Serum       Unremarkable immunofixation electrophoresis.   TSH      0.30 - 5.00 uIU/mL    Vitamin B-12      213 - 816 pg/mL    Vitamin B1, Whole Blood      70 - 180 nmol/L    Copper, Serum/Plasma      70.0 - 140.0 ug/dL    Ceruloplasmin      20 - 60 mg/dL    MG panel negative    IMPRESSION/REPORT/PLAN  Diplopia  Essential tremor  Hyperreflexia   Decreased vibratory sense  History of kidney stones  Coronary artery disease  Parkinsonism/resting tremor with decreased arm swing  Question of anxiety making tremors worse    This is a 82 year old male tremor suggestive of essential tremor.  Blood work is negative for cause of tremors.  Primidone caused side effects of lightheadedness.  Topamax cannot be used because of history of kidney stones.  Propanolol was not helpful either.  Metoprolol is currently being used for heart issues and will continue.  We will get him off the primidone since he does no longer wants to be on it.  Gabapentin has been discussed that he does not want to try any more medications.    On exam he might have a mild resting tremor in the both upper extremity with slight decreased arm swing bilaterally. Discussed about possibility of parkinsonism.  Could consider Parkinson's medication to see if that further helps his symptoms.  Discussed trial of Sinemet to see if that would help and he does not want to try the right now.  Discussed other signs symptoms of Parkinson's disease.    Him reporting that the tremors are related to anxiety-could consider an anxiety medication to see how much he improves    Patient does have some intermittent episodes of diplopia.  This could be related to myasthenia gravis since they are more prominent when he is more tired.  Myasthenia gravis panel was  negative.  We will continue to monitor.  Stable.  Could consider MRI of the brain.  He thinks it is more related to puffiness of his eyes.  Will monitor.    Previously-on examination patient has decreased vibratory sense in the right foot along with hyperreflexia.  Is possible he has cervical spinal stenosis versus other lesions.  It is unclear if this is related to the tremor or not.  Most likely this is not related to the tremors.  Could check a MRI of the cervical spine down the road we will hold off for right now per patient request.  Stable.    I can see him back on as-needed basis if he wants to try more medication or do more testing.    -     metoprolol tartrate (LOPRESSOR) 25 MG tablet; Take 1 tablet (25 mg) by mouth 2 times daily  -     primidone (MYSOLINE) 50 MG tablet; 1 tab in AM and 2 tabs in PM-_STOP      Return if symptoms worsen or fail to improve, for In-Clinic Visit (must).    Over 30 minutes were spent coordinating the care for the patient on the day of the encounter.  This includes previsit, during visit and post visit activities as documented above.    Counseling patient.  Multiple problems reviewed/addressed.  Prescription management.  Medication side effect.  (Activities include but not inclusive of reviewing chart, reviewing outside records, reviewing labs and imaging study results as well as the images, patient visit time including getting history and exam,  use if applicable, review of test results with the patient and coming up with a plan in a shared model, counseling patient and family, education and answering patient questions, EMR , EMR diagnosis entry and problem list management, medication reconciliation and prescription management if applicable, paperwork if applicable, printing documents and documentation of the visit activities.)    Gabe Rosa MD  Neurologist  [facility]  Tel:- [phone]    This note was dictated  using voice recognition software.  Any grammatical or context distortions are unintentional and inherent to the software.

## 2023-04-23 ENCOUNTER — HEALTH MAINTENANCE LETTER (OUTPATIENT)
Age: 83
End: 2023-04-23

## 2023-06-02 ENCOUNTER — HEALTH MAINTENANCE LETTER (OUTPATIENT)
Age: 83
End: 2023-06-02

## 2024-06-30 ENCOUNTER — HEALTH MAINTENANCE LETTER (OUTPATIENT)
Age: 84
End: 2024-06-30

## 2025-07-13 ENCOUNTER — HEALTH MAINTENANCE LETTER (OUTPATIENT)
Age: 85
End: 2025-07-13